# Patient Record
Sex: FEMALE | Race: WHITE | NOT HISPANIC OR LATINO | Employment: FULL TIME | URBAN - METROPOLITAN AREA
[De-identification: names, ages, dates, MRNs, and addresses within clinical notes are randomized per-mention and may not be internally consistent; named-entity substitution may affect disease eponyms.]

---

## 2017-10-11 ENCOUNTER — OFFICE VISIT - RIVER FALLS (OUTPATIENT)
Dept: FAMILY MEDICINE | Facility: CLINIC | Age: 37
End: 2017-10-11

## 2017-10-26 ENCOUNTER — OFFICE VISIT - RIVER FALLS (OUTPATIENT)
Dept: FAMILY MEDICINE | Facility: CLINIC | Age: 37
End: 2017-10-26

## 2017-10-26 ASSESSMENT — MIFFLIN-ST. JEOR: SCORE: 1340.37

## 2018-11-14 ENCOUNTER — OFFICE VISIT - RIVER FALLS (OUTPATIENT)
Dept: FAMILY MEDICINE | Facility: CLINIC | Age: 38
End: 2018-11-14

## 2018-11-14 ASSESSMENT — MIFFLIN-ST. JEOR: SCORE: 1344.9

## 2020-01-02 ENCOUNTER — OFFICE VISIT - RIVER FALLS (OUTPATIENT)
Dept: FAMILY MEDICINE | Facility: CLINIC | Age: 40
End: 2020-01-02

## 2020-01-02 ASSESSMENT — MIFFLIN-ST. JEOR: SCORE: 1358.51

## 2021-01-04 ENCOUNTER — OFFICE VISIT - RIVER FALLS (OUTPATIENT)
Dept: FAMILY MEDICINE | Facility: CLINIC | Age: 41
End: 2021-01-04

## 2021-01-04 ASSESSMENT — MIFFLIN-ST. JEOR: SCORE: 1360.33

## 2021-10-29 ENCOUNTER — OFFICE VISIT - RIVER FALLS (OUTPATIENT)
Dept: FAMILY MEDICINE | Facility: CLINIC | Age: 41
End: 2021-10-29

## 2021-10-29 ASSESSMENT — MIFFLIN-ST. JEOR: SCORE: 1365.77

## 2022-01-04 ENCOUNTER — COMMUNICATION - RIVER FALLS (OUTPATIENT)
Dept: FAMILY MEDICINE | Facility: CLINIC | Age: 42
End: 2022-01-04

## 2022-01-05 ENCOUNTER — COMMUNICATION - RIVER FALLS (OUTPATIENT)
Dept: FAMILY MEDICINE | Facility: CLINIC | Age: 42
End: 2022-01-05

## 2022-01-14 ENCOUNTER — AMBULATORY - RIVER FALLS (OUTPATIENT)
Dept: FAMILY MEDICINE | Facility: CLINIC | Age: 42
End: 2022-01-14

## 2022-01-26 ENCOUNTER — OFFICE VISIT - RIVER FALLS (OUTPATIENT)
Dept: FAMILY MEDICINE | Facility: CLINIC | Age: 42
End: 2022-01-26

## 2022-01-26 LAB
CHOL/HDL RATIO - HISTORICAL: 2.6 UMOL/L
CHOLEST SERPL-MCNC: 261 MG/DL
GLUCOSE BLD-MCNC: 86 MG/DL
HDLC SERPL-MCNC: 102 MG/DL
LDLC SERPL CALC-MCNC: 160 MG/DL
TRIGL SERPL-MCNC: 110 MG/DL

## 2022-01-28 ENCOUNTER — TRANSFERRED RECORDS (OUTPATIENT)
Dept: HEALTH INFORMATION MANAGEMENT | Facility: CLINIC | Age: 42
End: 2022-01-28

## 2022-01-28 ENCOUNTER — COMMUNICATION - RIVER FALLS (OUTPATIENT)
Dept: FAMILY MEDICINE | Facility: CLINIC | Age: 42
End: 2022-01-28

## 2022-01-28 LAB — HPV ABSTRACT: NORMAL

## 2022-02-11 VITALS
DIASTOLIC BLOOD PRESSURE: 92 MMHG | OXYGEN SATURATION: 98 % | HEIGHT: 65 IN | HEART RATE: 69 BPM | WEIGHT: 154.2 LBS | TEMPERATURE: 98.9 F | SYSTOLIC BLOOD PRESSURE: 140 MMHG | BODY MASS INDEX: 25.69 KG/M2

## 2022-02-11 VITALS
OXYGEN SATURATION: 99 % | HEART RATE: 53 BPM | DIASTOLIC BLOOD PRESSURE: 78 MMHG | HEIGHT: 65 IN | WEIGHT: 150.8 LBS | BODY MASS INDEX: 25.12 KG/M2 | SYSTOLIC BLOOD PRESSURE: 122 MMHG

## 2022-02-11 VITALS
HEART RATE: 64 BPM | BODY MASS INDEX: 25.89 KG/M2 | WEIGHT: 155.4 LBS | OXYGEN SATURATION: 98 % | DIASTOLIC BLOOD PRESSURE: 82 MMHG | TEMPERATURE: 97.6 F | HEIGHT: 65 IN | SYSTOLIC BLOOD PRESSURE: 130 MMHG

## 2022-02-11 VITALS
BODY MASS INDEX: 24.96 KG/M2 | TEMPERATURE: 98.3 F | WEIGHT: 149.8 LBS | DIASTOLIC BLOOD PRESSURE: 84 MMHG | SYSTOLIC BLOOD PRESSURE: 135 MMHG | HEART RATE: 53 BPM | HEIGHT: 65 IN

## 2022-02-11 VITALS
TEMPERATURE: 97.2 F | HEART RATE: 60 BPM | WEIGHT: 153.8 LBS | HEIGHT: 65 IN | SYSTOLIC BLOOD PRESSURE: 124 MMHG | BODY MASS INDEX: 25.62 KG/M2 | DIASTOLIC BLOOD PRESSURE: 70 MMHG

## 2022-02-16 NOTE — PROGRESS NOTES
Patient:   CASS ALLEN            MRN: [id number]            FIN: [id number]               Age:   37 years     Sex:  Female     :  1980   Associated Diagnoses:   Depression, Recurrent; Annual physical exam   Author:   Joana Louis      Visit Information      Date of Service: 10/26/2017 04:00 pm  Performing Location: [facility]  Encounter#: [id number]      Primary Care Provider (PCP):  Joel Smallwood MD    NPI# [id number]      Referring Provider:  Joana Louis    NPI# [id number]      Chief Complaint   10/26/2017 4:17 PM CDT   Px. No other concerns today.      History of Present Illness   Patient presents for annual physical exam.  Denies any concerns at this time.  Remains on sertraline for depression, PHQ9 = 2.  Normal PAP with negative HPV 2016.      Review of Systems   Constitutional:  Negative.    Eye:  Negative.    Ear/Nose/Mouth/Throat:  Negative.    Respiratory:  Negative.    Cardiovascular:  Negative.    Gastrointestinal:  Negative.    Genitourinary:  Negative.    Gynecologic:  Negative.    Hematology/Lymphatics:  Negative.    Endocrine:  Negative.    Immunologic:  Negative.    Musculoskeletal:  Negative.    Integumentary:  Negative.    Neurologic:  Negative.    Psychiatric:  Negative.              Health Status   Allergies:    Allergic Reactions (Selected)  Severity Not Documented  Percocet 10/325 (Itchy)   Problem list:    All Problems  Dyslipidemia / ICD-9-.4 / Confirmed  GERD (Gastroesophageal Reflux Disease) / ICD-9-.81 / Confirmed  Hyperhidrosis / ICD-9-.8 / Confirmed  Depression, Recurrent / SNOMED CT 059404274 / Confirmed  Resolved: Pregnancy / SNOMED CT 317372681  Resolved: Tobacco abuse / ICD-9-.1   Medications:  (Selected)   Prescriptions  Prescribed  sertraline 100 mg oral tablet: 1 tab(s) ( 100 mg ), po, daily, # 90 tab(s), 4 Refill(s), Type: Maintenance, Pharmacy: [facility], Pt must be seen for further  refills--overdue for annual visit w/ NCB., 1 tab(s) po daily      Histories   Past Medical History:    Active  GERD (Gastroesophageal Reflux Disease) (530.81)  Depression, Recurrent (269365966)  Dyslipidemia (272.4)  Hyperhidrosis (780.8)  Resolved  Tobacco abuse (305.1):  Resolved.  Comments:  2014 CDT 6:27 PM CDT - Argelia Richardson  Quit.  Pregnancy (896286933):  Resolved on 10/27/2001 at 21 years.   Family History:    Multiple Sclerosis  Mother (Ange)  CA - Lung cancer  Grandmother (M)  Cancer of ovary  Aunt (M) ()  Hypercholesterolemia  Father  Tobacco abuse  Grandmother (M)  Father     Procedure history:    HPV - Human papillomavirus test negative (6591083879) on 4/3/2012 at 31 Years.  Comments:  2012 11:07 AM - Argelia Richardson  Low risk for cervical cancer. OK to have pap q 3 yrs.  Meniscal repair (258813613) in the month of 2009 at 29 Years.  Comments:  3/28/2011 6:03 PM - Denise Estrada  Left knee (?ACL tear)  HPV - Human papillomavirus test negative (2926785026) on 2009 at 28 Years.  Comments:  3/29/2011 4:24 PM - Argelia Richardson  Low risk for cervical cancer. OK to have pap q 3 yrs.  Liposuction of abdomen (7349179151) in the month of 2004 at 24 Years.   section (90851212) in the month of 10/2001 at 21 Years.  Comments:  3/28/2011 5:48 PM - Denise Estrada  Failure to progress.  D&C - Dilatation and curettage (1234236440) on 7/3/2000 at 20 Years.  Comments:  3/28/2011 5:48 PM - Denise Estrada  Miscarriage  LASIK ([id number]).   Social History:        Alcohol Assessment            1-2 times per week      Tobacco Assessment: Past            Cigarettes                     Comments:                      2014 - Jolene Aguila CMA                     Quit 2011 - Argelia Richardson                     3-4/week.      Substance Abuse Assessment: Denies Substance Abuse            Never      Employment and  Education Assessment            Employed, Work/School description: .      Home and Environment Assessment            Marital status:  (Living together).  Spouse/Partner name: Ariel Gomez.      Nutrition and Health Assessment            Type of diet: Regular.      Exercise and Physical Activity Assessment            Exercise frequency: 5-6 times/week.  Exercise type: Aerobics, Weight lifting, Yoga.      Sexual Assessment            Sexually active: Yes.  Sexual orientation: Heterosexual.  Contraceptive Use Details:  vasectomy.        Physical Examination   Vital Signs   10/26/2017 4:17 PM CDT Temperature Tympanic 98.3 DegF    Peripheral Pulse Rate 53 bpm  LOW    Pulse Site Brachial artery    HR Method Electronic    Systolic Blood Pressure 135 mmHg    Diastolic Blood Pressure 84 mmHg    Mean Arterial Pressure 101 mmHg    BP Site Right arm    BP Method Electronic      Measurements from flowsheet : Measurements   10/26/2017 4:17 PM CDT Height Measured - Standard 65 in    Weight Measured - Standard 149.8 lb    BSA 1.76 m2    Body Mass Index 24.93 kg/m2      General:  Alert and oriented, No acute distress.    Eye:  Pupils are equal, round and reactive to light, Extraocular movements are intact, Normal conjunctiva.    HENT:  Normocephalic, Tympanic membranes are clear, Oral mucosa is moist, No pharyngeal erythema.    Neck:  Supple, Non-tender, No lymphadenopathy, No thyromegaly.    Respiratory:  Breath sounds are equal, Symmetrical chest wall expansion.         Respirations: Are within normal limits.         Pattern: Regular.         Breath sounds: Bilateral, Within normal limits.    Cardiovascular:  Normal rate, Regular rhythm, No murmur, Normal peripheral perfusion, No edema.    Gastrointestinal:  Soft, Non-tender, Non-distended, Normal bowel sounds, No organomegaly.    Musculoskeletal:  Normal range of motion, Normal strength, No tenderness, Normal gait.    Integumentary:  Warm, Dry, No  rash.    Neurologic:  Alert, Oriented, Normal sensory, Normal motor function, Cranial Nerves II-XII are grossly intact.    Psychiatric:  Cooperative, Appropriate mood & affect, Normal judgment, Non-suicidal.       Review / Management   Results review      Impression and Plan   Diagnosis     Depression, Recurrent (ZZY80-GP F33.0).     Annual physical exam (KRW20-OV Z00.00).     Course:  Progressing as expected.    Patient Instructions:       Counseled: Patient, Regarding diagnosis, Regarding treatment, Regarding medications, Activity, Verbalized understanding.    Orders     Orders (Selected)   Prescriptions  Prescribed  sertraline 100 mg oral tablet: 1 tab(s) ( 100 mg ), po, daily, # 90 tab(s), 4 Refill(s), Type: Maintenance, Pharmacy: [facility] [place], Pt must be seen for further refills--overdue for annual visit w/ NCB., 1 tab(s) po daily.

## 2022-02-16 NOTE — PROGRESS NOTES
Patient:   CASS ALLEN            MRN: [id number]            FIN: [id number]               Age:   38 years     Sex:  Female     :  1980   Associated Diagnoses:   Well adult; Depression, Recurrent   Author:   Joana Louis      Chief Complaint   2018 3:51 PM CST   Annual exam        Well Adult History   Well Adult History             The patient presents for well adult exam, doing well  wants to continue sertraline  pap UTD 2016, no hx abnormal  will take flu shot today  no concerns  works in collections.  The general health status is good.  The patient's diet is described as balanced.  Exercise: routine.  Associated symptoms consist of none.  Medical encounters:.  Additional pertinent history: tobacco use none.        Review of Systems   Constitutional:  Negative except as documented in history of present illness.    Eye:  Negative except as documented in history of present illness.    Respiratory:  Negative except as documented in history of present illness.    Cardiovascular:  Negative except as documented in history of present illness.    Breast:  Negative.    Gastrointestinal:  Negative except as documented in history of present illness.    Genitourinary:  Negative except as documented in history of present illness.    Gynecologic:  Negative except as documented in history of present illness.    Hematology/Lymphatics:  Negative except as documented in history of present illness.    Endocrine:  Negative except as documented in history of present illness.    Immunologic:  Negative except as documented in history of present illness.    Musculoskeletal:  Negative except as documented in history of present illness.    Integumentary:  Negative except as documented in history of present illness.    Neurologic:  Negative except as documented in history of present illness.    Psychiatric:  Negative except as documented in history of present illness.              Health Status   Allergies:    Allergic  Reactions (Selected)  Severity Not Documented  Percocet 10/325 (Itchy)   Medications:  (Selected)   Prescriptions  Prescribed  sertraline 100 mg oral tablet: = 1 tab(s) ( 100 mg ), Oral, daily, # 90 tab(s), 4 Refill(s), Type: Maintenance, Pharmacy: [facility], \   Problem list:    All Problems  GERD (gastroesophageal reflux disease) / SNOMED CT 827456975 / Confirmed  Depression, Recurrent / SNOMED CT 622663382 / Confirmed  Dyslipidemia / SNOMED CT 9289996352 / Confirmed  Hyperhidrosis / SNOMED CT 174806434 / Confirmed  Resolved: Tobacco abuse / ICD-9-.1  Quit.  Resolved: Pregnancy / SNOMED CT 702140633      Histories   Past Medical History:    Active  GERD (gastroesophageal reflux disease) (465666858)  Depression, Recurrent (859208868)  Dyslipidemia (2469750033)  Hyperhidrosis (771799121)  Resolved  Tobacco abuse (305.1):  Resolved.  Comments:  2014 CDT 6:27 PM CDT - Argelia Richardson  Quit.  Pregnancy (834404094):  Resolved on 10/27/2001 at 21 years.   Family History:    Son: Alex      History is negative.  Mother: Ange    Multiple Sclerosis  Father  Tobacco abuse  Hypercholesterolemia  Sister    History is negative.  Brother    History is negative.  Grandmother (M)  CA - Lung cancer  Tobacco abuse  Aunt (M):  ()  Age at death unknown.   Cancer of ovary     Procedure history:    HPV - Human papillomavirus test negative (SNOMED CT 7428032974) on 4/3/2012 at 31 Years.  Comments:  2012 11:07 AM - Argelia Richardson  Low risk for cervical cancer. OK to have pap q 3 yrs.  Meniscal repair (SNOMED CT 435640368) in the month of 2009 at 29 Years.  Comments:  3/28/2011 6:03 PM - Denise Estrada  Left knee (?ACL tear)  HPV - Human papillomavirus test negative (SNOMED CT 4517413333) on 2009 at 28 Years.  Comments:  3/29/2011 4:24 PM - Argelia Richardson  Low risk for cervical cancer. OK to have pap q 3 yrs.  Liposuction of abdomen (SNOMED CT 6318932882)  in the month of 2004 at 24 Years.   section (SNOMED CT 31272323) in the month of 10/2001 at 21 Years.  Comments:  3/28/2011 5:48 PM - Denise Estrada  Failure to progress.  D&C - Dilatation and curettage (SNOMED CT 8740949707) on 7/3/2000 at 20 Years.  Comments:  3/28/2011 5:48 PM - Denise Estrada  Miscarriage  LASIK (SNOMED CT 1497847478).      Physical Examination   Vital Signs   2018 3:51 PM CST Peripheral Pulse Rate 53 bpm  LOW    Pulse Site Radial artery    Systolic Blood Pressure 122 mmHg    Diastolic Blood Pressure 78 mmHg    Mean Arterial Pressure 93 mmHg    BP Site Right arm    Oxygen Saturation 99 %      Measurements from flowsheet : Measurements   2018 3:51 PM CST Height Measured - Standard 65.00 in    Weight Measured - Standard 150.8 lb    BSA 1.77 m2    Body Mass Index 25.09 kg/m2  HI      General:  Alert and oriented, No acute distress, vital signs stable, as noted above.    Eye:  Pupils are equal, round and reactive to light, Extraocular movements are intact, Normal conjunctiva.    HENT:  Normocephalic, Tympanic membranes are clear, Normal hearing, Oral mucosa is moist, No pharyngeal erythema.    Neck:  Supple, Non-tender, No lymphadenopathy, No thyromegaly.    Respiratory:  Lungs are clear to auscultation, Respirations are non-labored, Breath sounds are equal, Symmetrical chest wall expansion.    Cardiovascular:  Normal rate, Regular rhythm, No murmur, No edema.    Breast:  No mass, No tenderness, No discharge, Breasts examined .  No infra nor supraclavicular nodes palpable.  No axillary nodes or masses palpable.  No nipple discharge. Breasts normal throughout.    Gastrointestinal:  Soft, Non-tender, Non-distended, No organomegaly.    Musculoskeletal:  Normal range of motion, Normal strength, No deformity, Normal gait.    Integumentary:  Warm, Dry, Pink, Intact, No rash.    Neurologic:  Alert, Oriented, Normal sensory, Normal motor function, Cranial Nerves II-XII are grossly  intact.    Psychiatric:  Cooperative, Appropriate mood & affect, Normal judgment, PHQ 9/CAGE questionaire reviewed and discussed with patient,  see score.       Impression and Plan   Diagnosis     Well adult (TWB26-AJ Z00.00).     Depression, Recurrent (RDM98-TL F33.0).     Patient Instructions:       Counseled: Patient, Regarding diagnosis, Regarding medications, Verbalized understanding, counseled on health benefits of healthy weight, regular exercise, healthy diet.    Orders     Orders (Selected)   Prescriptions  Prescribed  sertraline 100 mg oral tablet: = 1 tab(s) ( 100 mg ), Oral, daily, # 90 tab(s), 4 Refill(s), Type: Maintenance, Pharmacy: [facility] [place], \.

## 2022-02-16 NOTE — NURSING NOTE
Depression Screening Entered On:  1/3/2020 10:54 AM CST    Performed On:  1/2/2020 10:52 AM CST by Marisel Raymundo               Depression Screening   Little Interest - Pleasure in Activities :   Not at all   Feeling Down, Depressed, Hopeless :   Not at all   Initial Depression Screen Score :   0    Trouble Falling or Staying Asleep :   Not at all   Feeling Tired or Little Energy :   Not at all   Poor Appetite or Overeating :   Not at all   Feeling Bad About Yourself :   Not at all   Trouble Concentrating :   Not at all   Moving or Speaking Slowly :   Not at all   Thoughts Better Off Dead or Hurting Self :   Not at all   Detailed Depression Screen Score :   0    Total Depression Screen Score :   0    Marisel Raymundo - 1/3/2020 10:52 AM CST

## 2022-02-16 NOTE — NURSING NOTE
"Comprehensive Intake Entered On:  10/29/2021 7:08 AM CDT    Performed On:  10/29/2021 7:05 AM CDT by Reta Londono LPN               Summary   Chief Complaint :   having pain in her collar bone, \"feels out of place\", has been seeing chiroprator for this   Menstrual Status :   Menarcheal   Weight Measured :   155.4 lb(Converted to: 155 lb 6 oz, 70.488 kg)    Height Measured :   65 in(Converted to: 5 ft 5 in, 165.10 cm)    Body Mass Index :   25.86 kg/m2 (HI)    Body Surface Area :   1.8 m2   Systolic Blood Pressure :   130 mmHg   Diastolic Blood Pressure :   82 mmHg (HI)    Mean Arterial Pressure :   98 mmHg   Peripheral Pulse Rate :   64 bpm   BP Site :   Right arm   BP Method :   Manual   Temperature Tympanic :   97.6 DegF(Converted to: 36.4 DegC)  (LOW)    Oxygen Saturation :   98 %   Race :      Languages :   English   Ethnicity :   Not  or    Reta Londono LPN - 10/29/2021 7:05 AM CDT   Health Status   Allergies Verified? :   Yes   Medication History Verified? :   Yes   Medical History Verified? :   No   Pre-Visit Planning Status :   Not completed   Tobacco Use? :   Former smoker   Reta Londono LPN - 10/29/2021 7:05 AM CDT   Meds / Allergies   (As Of: 10/29/2021 7:08:07 AM CDT)   Allergies (Active)   Percocet 10/325  Estimated Onset Date:   Unspecified ; Reactions:   Itchy ; Created By:   Kathi Levine; Reaction Status:   Active ; Category:   Drug ; Substance:   Percocet 10/325 ; Type:   Allergy ; Updated By:   Kathi Levine; Reviewed Date:   1/2/2020 5:19 PM CST        Medication List   (As Of: 10/29/2021 7:08:07 AM CDT)   Prescription/Discharge Order    sertraline  :   sertraline ; Status:   Prescribed ; Ordered As Mnemonic:   sertraline 100 mg oral tablet ; Simple Display Line:   100 mg, 1 tab(s), Oral, daily, 90 tab(s), 3 Refill(s) ; Ordering Provider:   Joana Louis; Catalog Code:   sertraline ; Order Dt/Tm:   1/4/2021 4:24:22 PM CST  "

## 2022-02-16 NOTE — NURSING NOTE
Comprehensive Intake Entered On:  1/2/2020 5:20 PM CST    Performed On:  1/2/2020 5:15 PM CST by Lenora Geller               Summary   Chief Complaint :   Pt here ofr annual physical   Menstrual Status :   Menarcheal   Weight Measured :   153.8 lb(Converted to: 153 lb 13 oz, 69.76 kg)    Height Measured :   65 in(Converted to: 5 ft 5 in, 165.10 cm)    Body Mass Index :   25.59 kg/m2 (HI)    Body Surface Area :   1.79 m2   Systolic Blood Pressure :   124 mmHg   Diastolic Blood Pressure :   70 mmHg   Mean Arterial Pressure :   88 mmHg   Peripheral Pulse Rate :   60 bpm   BP Site :   Right arm   Pulse Site :   Radial artery   BP Method :   Manual   HR Method :   Manual   Temperature Tympanic :   97.2 DegF(Converted to: 36.2 DegC)  (LOW)    Race :      Languages :   English   Ethnicity :   Not  or    Lenora Geller - 1/2/2020 5:15 PM CST   Health Status   Allergies Verified? :   Yes   Medication History Verified? :   Yes   Medical History Verified? :   Yes   Pre-Visit Planning Status :   Completed   Tobacco Use? :   Never smoker   Lenora Geller - 1/2/2020 5:15 PM CST   Consents   Consent for Immunization Exchange :   Consent Granted   Consent for Immunizations to Providers :   Consent Granted   Lenora Geller - 1/2/2020 5:15 PM CST   Meds / Allergies   (As Of: 1/2/2020 5:20:01 PM CST)   Allergies (Active)   Percocet 10/325  Estimated Onset Date:   Unspecified ; Reactions:   Itchy ; Created By:   Kathi Levine; Reaction Status:   Active ; Category:   Drug ; Substance:   Percocet 10/325 ; Type:   Allergy ; Updated By:   Kathi Levine; Reviewed Date:   1/2/2020 5:19 PM CST        Medication List   (As Of: 1/2/2020 5:20:01 PM CST)   Prescription/Discharge Order    sertraline  :   sertraline ; Status:   Prescribed ; Ordered As Mnemonic:   sertraline 100 mg oral tablet ; Simple Display Line:   100 mg, 1 tab(s), Oral, daily, 30 tab(s), 0 Refill(s) ; Ordering Provider:   Catrachito CABELLO,  Joana; Catalog Code:   sertraline ; Order Dt/Tm:   12/9/2019 5:41:08 PM CST

## 2022-02-16 NOTE — NURSING NOTE
Comprehensive Intake Entered On:  1/4/2021 4:06 PM CST    Performed On:  1/4/2021 4:00 PM CST by Reta Londono LPN               Summary   Chief Complaint :   here for annual exam   Last Menstrual Period :   12/18/2020 CST   Menstrual Status :   Menarcheal   Weight Measured :   154.2 lb(Converted to: 154 lb 3 oz, 69.944 kg)    Height Measured :   65 in(Converted to: 5 ft 5 in, 165.10 cm)    Body Mass Index :   25.66 kg/m2 (HI)    Body Surface Area :   1.79 m2   Systolic Blood Pressure :   140 mmHg (HI)    Diastolic Blood Pressure :   92 mmHg (HI)    Mean Arterial Pressure :   108 mmHg   Peripheral Pulse Rate :   69 bpm   BP Site :   Right arm   BP Method :   Manual   Temperature Tympanic :   98.9 DegF(Converted to: 37.2 DegC)    Oxygen Saturation :   98 %   Race :      Languages :   English   Ethnicity :   Not  or    Reta Londono LPN - 1/4/2021 4:00 PM CST   Health Status   Allergies Verified? :   Yes   Medication History Verified? :   Yes   Medical History Verified? :   No   Pre-Visit Planning Status :   Completed   Tobacco Use? :   Former smoker   Reta Londono LPN - 1/4/2021 4:00 PM CST   Meds / Allergies   (As Of: 1/4/2021 4:06:59 PM CST)   Allergies (Active)   Percocet 10/325  Estimated Onset Date:   Unspecified ; Reactions:   Itchy ; Created By:   Kathi Levine; Reaction Status:   Active ; Category:   Drug ; Substance:   Percocet 10/325 ; Type:   Allergy ; Updated By:   Kathi Levine; Reviewed Date:   1/2/2020 5:19 PM CST        Medication List   (As Of: 1/4/2021 4:06:59 PM CST)   Prescription/Discharge Order    sertraline  :   sertraline ; Status:   Prescribed ; Ordered As Mnemonic:   sertraline 100 mg oral tablet ; Simple Display Line:   100 mg, 1 tab(s), Oral, daily, 90 tab(s), 3 Refill(s) ; Ordering Provider:   Joana Louis; Catalog Code:   sertraline ; Order Dt/Tm:   1/2/2020 5:33:16 PM CST            ID Risk Screen   Recent Travel History :   No recent  travel   Family Member Travel History :   No recent travel   Other Exposure to Infectious Disease :   Unknown   COVID-19 Testing Status :   No positive COVID-19 test   Reta Londono LPN - 1/4/2021 4:00 PM CST   Social History   Social History   (As Of: 1/4/2021 4:06:59 PM CST)   Alcohol:        1-2 times per week   (Last Updated: 3/29/2011 4:16:41 PM CDT by Jolene Aguila CMA)          Tobacco:        Former smoker, quit more than 30 days ago, Cigarettes   (Last Updated: 1/4/2021 4:04:56 PM CST by Reta Londono LPN)          Electronic Cigarette/Vaping:        Electronic Cigarette Use: Never.   (Last Updated: 1/4/2021 4:05:00 PM CST by Reta Londono LPN)          Substance Abuse:        Never   (Last Updated: 8/28/2015 12:00:25 PM CDT by Roxanna Cruz CMA)          Employment/School:        Employed, Work/School description: .   (Last Updated: 8/28/2015 12:00:41 PM CDT by Roxanna Cruz CMA)          Home/Environment:        Marital status:  (Living together).  Spouse/Partner name: Ariel Gomez.  Risks in environment: owns secured gun.   (Last Updated: 10/31/2017 1:55:38 PM CDT by Juliette Stewart)          Nutrition/Health:        Type of diet: Regular.   (Last Updated: 8/28/2015 12:01:05 PM CDT by Roxanna Cruz CMA)          Exercise:        Exercise frequency: Daily.  Exercise type: cardio, strength, stretch.   (Last Updated: 10/31/2017 1:56:11 PM CDT by Juliette Stewart)          Sexual:        Sexually active: Yes.  Sexual orientation: Heterosexual.  Contraceptive Use Details:  vasectomy.   (Last Updated: 9/28/2016 9:35:47 AM CDT by Roxanna Cruz CMA)

## 2022-02-16 NOTE — PROGRESS NOTES
"   Patient:   IZZY ALLEN            MRN: [id number]            FIN: [id number]               Age:   41 years     Sex:  Female     :  1980   Associated Diagnoses:   Bunion of left foot; Bunion of right foot; Changing skin lesion; Clavicular asymmetry; Tendonitis of shoulder, right; Shoulder pain, right   Author:   Joana Louis      Visit Information      Date of Service: 10/29/2021 06:57 am  Performing Location: [facility]  Encounter#: [id number]      Primary Care Provider (PCP):  Joana Louis    NPI# [id number]      Referring Provider:  Joana Louis    NPI# [id number]      Chief Complaint   10/29/2021 7:05 AM CDT   having pain in her collar bone, \"feels out of place\", has been seeing chiroprator for this      History of Present Illness   Izzy is here today to check on her right collar bone.  About 6 weeks ago, she was painting a house and then within a few days, she felt like her right shoulder/collar bone muscles were tight.  She does not recall an injury to the area.  She does lift weights frequently but does not recall an injury with this.  She has not been able to lift weights on that side since the pain started.  She feels discomfort constantly but the pain sharpens with certain movements.  She has full ROM but reports pain and favoring of that side.  She noticed that her right clavicle is bulging a bit and there is a \"clicking feeling\" with certain arm/shoulder movements.  Yesika bruising.  She has seen the chiropractor 3x without relief.      She also has a small lump on the top of her right hand that she would like checked out, feels like it's getting bigger.   She also has bilateral bunions so she would like to see the foot and ankle specialist.   She would like a flu shot today.       Review of Systems   Constitutional:  Negative.    Respiratory:  Negative.    Cardiovascular:  Negative.    Gastrointestinal:  Negative.    Hematology/Lymphatics:  Negative.  "   Endocrine:  Negative.    Immunologic:  Negative.    Musculoskeletal:  Negative except as documented in history of present illness.    Integumentary:  Negative except as documented in history of present illness.    Neurologic:  Negative.    Psychiatric:  Negative.       Health Status   Allergies:    Allergic Reactions (Selected)  Severity Not Documented  Percocet 10/325 (Itchy)   Medications:  (Selected)   Prescriptions  Prescribed  sertraline 100 mg oral tablet: = 1 tab(s) ( 100 mg ), Oral, daily, # 90 tab(s), 3 Refill(s), Type: Maintenance, Pharmacy: [facility], \, 1 tab(s) Oral daily, 65, in, 21 16:00:00 CST, Height Measured, 154.2, lb, 21 16:00:00 CST, Weight Measured,    Medications          *denotes recorded medication          sertraline 100 mg oral tablet: 100 mg, 1 tab(s), Oral, daily, 90 tab(s), 3 Refill(s).       Problem list:    All Problems (Selected)  Dyslipidemia / SNOMED CT 2601448027 / Confirmed  GERD (gastroesophageal reflux disease) / SNOMED CT 615123245 / Confirmed  Hyperhidrosis / SNOMED CT 033010166 / Confirmed  Depression, Recurrent / SNOMED CT 390900425 / Confirmed      Histories   Past Medical History:    Active  GERD (gastroesophageal reflux disease) (528482962)  Depression, Recurrent (732279773)  Dyslipidemia (8131200231)  Hyperhidrosis (696256028)  Resolved  Tobacco abuse (305.1):  Resolved.  Comments:  2014 CDT 6:27 PM CDT - Norma CABELLO, Argelia Guerrero.  Pregnancy (107927936):  Resolved on 10/27/2001 at 21 years.   Family History:    Multiple Sclerosis  Mother (Aneg)  CA - Lung cancer  Grandmother (M)  Cancer of ovary  Aunt (M) ()  Hypercholesterolemia  Father  Tobacco abuse  Grandmother (M)  Father  Depression  Father  Grandparent     Procedure history:    Mammogram (143223880) on 2020 at 39 Years.  Comments:  2020 7:50 AM CST - Reta Londono LPN  ACR 1 Negative. Recommend annual mammograms.  HPV - Human  papillomavirus test negative (2818369469) on 4/3/2012 at 31 Years.  Comments:  2012 11:07 AM CDT - Argelia Richardson  Low risk for cervical cancer. OK to have pap q 3 yrs.  Meniscal repair (766105994) in the month of 2009 at 29 Years.  Comments:  3/28/2011 6:03 PM LIVIAT - Denise Estrada  Left knee (?ACL tear)  HPV - Human papillomavirus test negative (7139794762) on 2009 at 28 Years.  Comments:  3/29/2011 4:24 PM LIVIAT - Argelia Richardson  Low risk for cervical cancer. OK to have pap q 3 yrs.  Liposuction of abdomen (7411144431) in the month of 2004 at 24 Years.   section (42866130) in the month of 10/2001 at 21 Years.  Comments:  3/28/2011 5:48 PM Denise Sanches  Failure to progress.  D&C - Dilatation and curettage (3208050992) on 7/3/2000 at 20 Years.  Comments:  3/28/2011 5:48 PM Denise Sanches  Miscarriage  LASIK (1812248968).   Social History:        Electronic Cigarette/Vaping Assessment            Electronic Cigarette Use: Never.      Alcohol Assessment            1-2 times per week      Tobacco Assessment            Former smoker, quit more than 30 days ago, Cigarettes      Substance Abuse Assessment            Never      Employment and Education Assessment            Employed, Work/School description: .      Home and Environment Assessment            Marital status:  (Living together).  Spouse/Partner name: Ariel Gomez.  Risks in environment: owns               secured gun.      Nutrition and Health Assessment            Type of diet: Regular.      Exercise and Physical Activity Assessment            Exercise frequency: Daily.  Exercise type: cardio, strength, stretch.      Sexual Assessment            Sexually active: Yes.  Sexual orientation: Heterosexual.  Contraceptive Use Details:  vasectomy.        Physical Examination   Vital Signs   10/29/2021 7:05 AM CDT   Temperature Tympanic      97.6 DegF  LOW     Measurements from flowsheet  ": Measurements   10/29/2021 7:05 AM CDT Height Measured - Standard 65 in    Weight Measured - Standard 155.4 lb    BSA 1.8 m2    Body Mass Index 25.86 kg/m2  HI      General:  Alert and oriented, No acute distress.    Neck:  Supple, Non-tender, No lymphadenopathy, No thyromegaly, Neck muscles without abnormality upon palpation. .    Respiratory:  Lungs are clear to auscultation, Respirations are non-labored, Breath sounds are equal, Symmetrical chest wall expansion, No chest wall tenderness.    Cardiovascular:  Normal rate, Regular rhythm, No murmur, No gallop.    Musculoskeletal:  Normal range of motion, Normal strength, No tenderness, Protrusion of right clavicle proximal to midline.  Palpable \"popping\" with shoulder ROM.  Asymmetrical compared to left clavicle.  Full ROM with facial grimacing when right arm extended upward, Bilateral bunions noted on lower great toe joints, no reddness. .    Integumentary:  Warm, Dry, Intact, 0.5cm slightly pink raised lesion on dorsal surface of right hand.  Negative for discharge or dimpling.   .    Neurologic:  Alert, Oriented, Normal sensory.    Psychiatric:  Cooperative, Appropriate mood & affect, Normal judgment.       Health Maintenance      Recommendations     Pending (in the next year)        OverDue           Lipid Disorders Screen due  10/17/16  and every 1  year(s)           Breast Cancer Screen due  02/05/21  and every 1  year(s)           Influenza Vaccine due  09/01/21  and every 1  year(s)           Cervical Cancer Screen (if sexually active) due  09/23/21  and every 5  year(s)        Due            Intimate Partner Violence Screening due  10/29/21  and every 1  year(s)           Type 2 Diabetes Mellitus Screen due  10/29/21  Variable frequency        Due In Future            Depression Screen not due until  01/05/22  and every 1  year(s)     Satisfied (in the past 1 year)        Satisfied            Body Mass Index Check on  10/29/21.           Body Mass Index " Check on  01/04/21.           Depression Screen on  01/05/21.           Depression Screen on  01/05/21.           Depression Screen on  01/05/21.           High Blood Pressure Screen on  10/29/21.           High Blood Pressure Screen on  01/04/21.           Influenza Vaccine on  01/04/21.           Influenza Vaccine on  01/04/21.           Tobacco Use Screen on  10/29/21.           Tobacco Use Screen on  01/04/21.          Review / Management   Radiology results   X-ray, X-ray reviewed with patient, no abnormality noted.  Will await radiology over-read and if differs such that treatment would be altered,  will notify patient.       Impression and Plan   Diagnosis     Bunion of left foot (UFS76-QF M21.612).     Bunion of right foot (TSG33-ZZ M21.611).     Changing skin lesion (MXJ09-WD L98.9).     Clavicular asymmetry (YYD66-IK Q74.0).     Tendonitis of shoulder, right (GGL84-CG M77.8).     Shoulder pain, right (IPS89-KR M25.511).     Summary:  Ice to right shoulder 3x/day.  Return to clinic if no relief with oral prednisone. , Right dorsal hand lesion treated with liquid nitrogen x3 freeze/thaw cycles, Flu shot provided today. .    Orders     Orders (Selected)   Outpatient Orders  Ordered  Referral (Request): 10/29/21 8:04:00 CDT, Referred to: Orthopaedics, Referred to: foot and ankle, Additional instructions: please call her to set up, Bunion of left foot  Bunion of right foot  Completed  XR Clavicle Right (Request): Shoulder pain, right  Clavicular asymmetry  XR Shoulder Right (Request): Shoulder pain, right  Clavicular asymmetry  Prescriptions  Prescribed  predniSONE 10 mg oral tablet: = 3 tab(s) ( 30 mg ), Oral, daily, # 30 tab(s), 0 Refill(s), Type: Maintenance, Pharmacy: [facility], 3 tab(s) Oral daily,x10 day(s), 65, in, 10/29/21 7:05:00 CDT, Height Measured, 155.4, lb, 10/29/21 7:05:00 CDT, Weight Measured...  sertraline 100 mg oral tablet: = 1 tab(s) ( 100 mg ), Oral, daily, # 90  tab(s), 3 Refill(s), Type: Maintenance, Pharmacy: [facility], \, 1 tab(s) Oral daily, 65, in, 01/04/21 16:00:00 CST, Height Measured, 154.2, lb, 01/04/21 16:00:00 CST, Weight Measured.

## 2022-02-16 NOTE — PROGRESS NOTES
Patient:   CASS ALLEN            MRN: [id number]            FIN: [id number]               Age:   39 years     Sex:  Female     :  1980   Associated Diagnoses:   Well adult; Depression, Recurrent   Author:   Joana Louis      Visit Information      Date of Service: 2020 04:52 pm  Performing Location: [facility]  Encounter#: [id number]      Chief Complaint   2020 5:29 PM CST     Pt here for annual physical  (Modified)       Well Adult History   Well Adult History             The patient presents for well adult exam, diong well  same partner, monthly periods LMP now  pap not due this year  has never had mammo, will set up this year  dry red flaking itchy skin left low latteral leg for 6 months, using lotion.  happy with sertrlaine, doesn't want to stop.  The general health status is good.  The patient's diet is described as balanced.  Exercise: routine.  Associated symptoms consist of none.  Last menstrual period: regular.  Medical encounters:.  Additional pertinent history: tobacco use none.        Review of Systems   Constitutional:  Negative except as documented in history of present illness.    Eye:  Negative except as documented in history of present illness.    Respiratory:  Negative except as documented in history of present illness.    Cardiovascular:  Negative except as documented in history of present illness.    Breast:  Negative.    Gastrointestinal:  Negative except as documented in history of present illness.    Genitourinary:  Negative except as documented in history of present illness.    Gynecologic:  Negative except as documented in history of present illness.    Hematology/Lymphatics:  Negative except as documented in history of present illness.    Endocrine:  Negative except as documented in history of present illness.    Immunologic:  Negative except as documented in history of present illness.    Musculoskeletal:  Negative except as documented in history of  present illness.    Integumentary:  Negative except as documented in history of present illness.    Neurologic:  Negative except as documented in history of present illness.    Psychiatric:  Negative except as documented in history of present illness.              Health Status   Allergies:    Allergic Reactions (Selected)  Severity Not Documented  Percocet 10/325 (Itchy)   Medications:  (Selected)   Prescriptions  Prescribed  sertraline 100 mg oral tablet: = 1 tab(s) ( 100 mg ), Oral, daily, # 90 tab(s), 3 Refill(s), Type: Maintenance, Pharmacy: [facility], \, 1 tab(s) Oral daily  triamcinolone 0.5% topical cream: See Instructions, Instructions: Topical bid for up to 1 month on left leg, # 20 gm, 0 Refill(s), Type: Maintenance, Pharmacy: [facility], Topical bid for up to 1 month on left leg   Problem list:    All Problems  Depression, Recurrent / SNOMED CT 000638077 / Confirmed  Dyslipidemia / SNOMED CT 6026682028 / Confirmed  GERD (gastroesophageal reflux disease) / SNOMED CT 829118201 / Confirmed  Hyperhidrosis / SNOMED CT 052357358 / Confirmed  Resolved: Pregnancy / SNOMED CT 047316775  Resolved: Tobacco abuse / ICD-9-.1  Quit.      Histories   Past Medical History:    Active  GERD (gastroesophageal reflux disease) (069587356)  Depression, Recurrent (660139984)  Dyslipidemia (4514721512)  Hyperhidrosis (388050509)  Resolved  Tobacco abuse (305.1):  Resolved.  Comments:  2014 CDT 6:27 PM CDT - Norma CABELLO, Argelia  Quit.  Pregnancy (625523180):  Resolved on 10/27/2001 at 21 years.   Family History:    Son: Alex      History is negative.  Mother: Ange    Multiple Sclerosis  Father  Tobacco abuse  Hypercholesterolemia  Sister    History is negative.  Brother    History is negative.  Grandmother (M)  CA - Lung cancer  Tobacco abuse  Aunt (M):  ()  Age at death unknown.   Cancer of ovary     Procedure history:    HPV - Human papillomavirus  test negative (SNOMED CT 7239669816) on 4/3/2012 at 31 Years.  Comments:  2012 11:07 AM CDT - Argelia Richardson  Low risk for cervical cancer. OK to have pap q 3 yrs.  Meniscal repair (SNOMED CT 782794520) in the month of 2009 at 29 Years.  Comments:  3/28/2011 6:03 PM CDT - Denise Estrada  Left knee (?ACL tear)  HPV - Human papillomavirus test negative (SNOMED CT 7404921951) on 2009 at 28 Years.  Comments:  3/29/2011 4:24 PM CDT - Argelia Richardson  Low risk for cervical cancer. OK to have pap q 3 yrs.  Liposuction of abdomen (SNOMED CT 3159503520) in the month of 2004 at 24 Years.   section (SNOMED CT 47578735) in the month of 10/2001 at 21 Years.  Comments:  3/28/2011 5:48 PM Denise Sanches  Failure to progress.  D&C - Dilatation and curettage (SNOMED CT 3518057742) on 7/3/2000 at 20 Years.  Comments:  3/28/2011 5:48 PM Denise Sanches  Miscarriage  LASIK (SNOMED CT 3332731217).   Social History:        Alcohol Assessment            1-2 times per week      Tobacco Assessment            Former smoker, quit more than 30 days ago      Substance Abuse Assessment            Never      Employment and Education Assessment            Employed, Work/School description: .      Home and Environment Assessment            Marital status:  (Living together).  Spouse/Partner name: Ariel Gomez.  Risks in environment: owns               secured gun.      Nutrition and Health Assessment            Type of diet: Regular.      Exercise and Physical Activity Assessment            Exercise frequency: Daily.  Exercise type: cardio, strength, stretch.      Sexual Assessment            Sexually active: Yes.  Sexual orientation: Heterosexual.  Contraceptive Use Details:  vasectomy.        Physical Examination   Vital Signs   2020 5:15 PM CST Temperature Tympanic 97.2 DegF  LOW    Peripheral Pulse Rate 60 bpm    Pulse Site Radial artery    HR Method Manual     Systolic Blood Pressure 124 mmHg    Diastolic Blood Pressure 70 mmHg    Mean Arterial Pressure 88 mmHg    BP Site Right arm    BP Method Manual      Measurements from flowsheet : Measurements   1/2/2020 5:15 PM CST Height Measured - Standard 65 in    Weight Measured - Standard 153.8 lb    BSA 1.79 m2    Body Mass Index 25.59 kg/m2  HI      General:  Alert and oriented, No acute distress, vital signs stable, as noted above.    Eye:  Pupils are equal, round and reactive to light, Extraocular movements are intact, Normal conjunctiva.    HENT:  Normocephalic, Tympanic membranes are clear, Normal hearing, Oral mucosa is moist, No pharyngeal erythema.    Neck:  Supple, Non-tender, No lymphadenopathy, No thyromegaly.    Respiratory:  Lungs are clear to auscultation, Respirations are non-labored, Breath sounds are equal, Symmetrical chest wall expansion.    Cardiovascular:  Normal rate, Regular rhythm, No murmur, No edema.    Breast:  No mass, No tenderness, No discharge, Breasts examined .  No infra nor supraclavicular nodes palpable.  No axillary nodes or masses palpable.  No nipple discharge. Breasts normal throughout.    Gastrointestinal:  Soft, Non-tender, Non-distended, No organomegaly.    Musculoskeletal:  Normal range of motion, Normal strength, No deformity, Normal gait.    Integumentary:  Warm, Dry, Pink, Intact, No rash, dry rough raised patch pink left latteral side, approx 1 cm diameter.    Neurologic:  Alert, Oriented, Normal sensory, Normal motor function, Cranial Nerves II-XII are grossly intact.    Psychiatric:  Cooperative, Appropriate mood & affect, Normal judgment, PHQ 9/CAGE questionaire reviewed and discussed with patient,  see score.       Impression and Plan   Diagnosis     Well adult (HUR46-ID Z00.00).     Depression, Recurrent (KRU68-CQ F33.0).     Plan:  will treat patch as dry skin but if not resolvling with steroid, need removal, she is in agreement.    Patient Instructions:       Counseled:  Patient, Regarding diagnosis, Regarding medications, Verbalized understanding, counseled on health benefits of healthy weight, regular exercise, healthy diet.    Orders     Orders (Selected)   Prescriptions  Prescribed  sertraline 100 mg oral tablet: = 1 tab(s) ( 100 mg ), Oral, daily, # 90 tab(s), 3 Refill(s), Type: Maintenance, Pharmacy: [facility], \, 1 tab(s) Oral daily  triamcinolone 0.5% topical cream: See Instructions, Instructions: Topical bid for up to 1 month on left leg, # 20 gm, 0 Refill(s), Type: Maintenance, Pharmacy: [facility], Topical bid for up to 1 month on left leg.     she will set up screening 3D mammo.

## 2022-02-16 NOTE — NURSING NOTE
Depression Screening Entered On:  1/5/2021 7:03 AM CST    Performed On:  1/5/2021 7:02 AM CST by Reta Londono LPN               Depression Screening   Little Interest - Pleasure in Activities :   Not at all   Feeling Down, Depressed, Hopeless :   Not at all   Initial Depression Screen Score :   0 Score   Poor Appetite or Overeating :   Not at all   Trouble Falling or Staying Asleep :   Not at all   Feeling Tired or Little Energy :   Not at all   Feeling Bad About Yourself :   Not at all   Trouble Concentrating :   Not at all   Moving or Speaking Slowly :   Not at all   Thoughts Better Off Dead or Hurting Self :   Not at all   Detailed Depression Screen Score :   0    Total Depression Screen Score :   0    Reta Londono LPN - 1/5/2021 7:02 AM CST

## 2022-02-16 NOTE — PROGRESS NOTES
Patient:   CASS ALLEN            MRN: [id number]            FIN: [id number]               Age:   40 years     Sex:  Female     :  1980   Associated Diagnoses:   Well adult; Depression, Recurrent   Author:   Joana Louis      Visit Information      Date of Service: 2021 03:54 pm  Performing Location: [facility]  Encounter#: [id number]      Primary Care Provider (PCP):  Joana Louis    NPI# [id number]      Referring Provider:  Joana Louis    NPI# [id number]      Chief Complaint   2021 4:00 PM CST     here for annual exam        Well Adult History   Well Adult History             The patient presents for well adult exam, here for annual, wants to defer pap till next year, have been  normal  monthly periods   with vasectomy  working to loose some wt  no concerns  mammo last year normal  happy with sertraline, would like to continue same dose.  The general health status is good.  The patient's diet is described as balanced.  Exercise: routine.  Associated symptoms consist of none.  Last menstrual period: regular.  Medical encounters:.  Additional pertinent history: tobacco use none.        Review of Systems   Constitutional:  Negative except as documented in history of present illness.    Eye:  Negative except as documented in history of present illness.    Respiratory:  Negative except as documented in history of present illness.    Cardiovascular:  Negative except as documented in history of present illness.    Breast:  Negative.    Gastrointestinal:  Negative except as documented in history of present illness.    Genitourinary:  Negative except as documented in history of present illness.    Gynecologic:  Negative except as documented in history of present illness.    Hematology/Lymphatics:  Negative except as documented in history of present illness.    Endocrine:  Negative except as documented in history of present illness.    Immunologic:  Negative except  as documented in history of present illness.    Musculoskeletal:  Negative except as documented in history of present illness.    Integumentary:  Negative except as documented in history of present illness.    Neurologic:  Negative except as documented in history of present illness.    Psychiatric:  Negative except as documented in history of present illness.              Health Status   Allergies:    Allergic Reactions (Selected)  Severity Not Documented  Percocet 10/325 (Itchy)   Medications:  (Selected)   Prescriptions  Prescribed  sertraline 100 mg oral tablet: = 1 tab(s) ( 100 mg ), Oral, daily, # 90 tab(s), 3 Refill(s), Type: Maintenance, Pharmacy: [facility], \, 1 tab(s) Oral daily, 65, in, 21 16:00:00 CST, Height Measured, 154.2, lb, 21 16:00:00 CST, Weight Measured   Problem list:    All Problems  Dyslipidemia / SNOMED CT 8862319899 / Confirmed  GERD (gastroesophageal reflux disease) / SNOMED CT 197823564 / Confirmed  Hyperhidrosis / SNOMED CT 534201055 / Confirmed  Depression, Recurrent / SNOMED CT 328389276 / Confirmed  Resolved: Pregnancy / SNOMED CT 218556635  Resolved: Tobacco abuse / ICD-9-.1  Quit.      Histories   Past Medical History:    Active  GERD (gastroesophageal reflux disease) (352187788)  Depression, Recurrent (755538349)  Dyslipidemia (7037055158)  Hyperhidrosis (460255789)  Resolved  Tobacco abuse (305.1):  Resolved.  Comments:  2014 CDT 6:27 PM CDT - Norma CABELLO, Argelia  Quit.  Pregnancy (696582345):  Resolved on 10/27/2001 at 21 years.   Family History:    Son: Alex      History is negative.  Mother: Ange    Multiple Sclerosis  Father  Tobacco abuse  Hypercholesterolemia  Sister    History is negative.  Brother    History is negative.  Grandmother (M)  CA - Lung cancer  Tobacco abuse  Aunt (M):  ()  Age at death unknown.   Cancer of ovary     Procedure history:    Mammogram (SNOMED CT 774478075) performed by Catrachito CABELLO,  Joana on 2020 at 39 Years.  Comments:  2020 7:50 AM DAVID - Reta Londono LPN  ACR 1 Negative. Recommend annual mammograms.  HPV - Human papillomavirus test negative (SNOMED CT 0000894501) on 4/3/2012 at 31 Years.  Comments:  2012 11:07 AM CDT - Norma NP-CArgelia  Low risk for cervical cancer. OK to have pap q 3 yrs.  Meniscal repair (SNOMED CT 079202822) in the month of 2009 at 29 Years.  Comments:  3/28/2011 6:03 PM CDT - Denise Estrada  Left knee (?ACL tear)  HPV - Human papillomavirus test negative (SNOMED CT 7012399928) on 2009 at 28 Years.  Comments:  3/29/2011 4:24 PM CDT - Carmen-Jannette NP-CArgelia  Low risk for cervical cancer. OK to have pap q 3 yrs.  Liposuction of abdomen (SNOMED CT 7137181115) in the month of 2004 at 24 Years.   section (SNOMED CT 89335209) in the month of 10/2001 at 21 Years.  Comments:  3/28/2011 5:48 PM Denise Sanches  Failure to progress.  D&C - Dilatation and curettage (SNOMED CT 8359203038) on 7/3/2000 at 20 Years.  Comments:  3/28/2011 5:48 PM Denise Sanches  Miscarriage  LASIK (SNOMED CT 7249440413).   Social History:        Electronic Cigarette/Vaping Assessment            Electronic Cigarette Use: Never.      Alcohol Assessment            1-2 times per week      Tobacco Assessment            Former smoker, quit more than 30 days ago, Cigarettes      Substance Abuse Assessment            Never      Employment and Education Assessment            Employed, Work/School description: .      Home and Environment Assessment            Marital status:  (Living together).  Spouse/Partner name: Ariel Gomez.  Risks in environment: owns               secured gun.      Nutrition and Health Assessment            Type of diet: Regular.      Exercise and Physical Activity Assessment            Exercise frequency: Daily.  Exercise type: cardio, strength, stretch.      Sexual Assessment            Sexually active: Yes.   Sexual orientation: Heterosexual.  Contraceptive Use Details:  vasectomy.        Physical Examination   Vital Signs   1/4/2021 4:00 PM CST Temperature Tympanic 98.9 DegF    Peripheral Pulse Rate 69 bpm    Systolic Blood Pressure 140 mmHg  HI    Diastolic Blood Pressure 92 mmHg  HI    Mean Arterial Pressure 108 mmHg    BP Site Right arm    BP Method Manual    Oxygen Saturation 98 %      Measurements from flowsheet : Measurements   1/4/2021 4:00 PM CST Height Measured - Standard 65 in    Weight Measured - Standard 154.2 lb    BSA 1.79 m2    Body Mass Index 25.66 kg/m2  HI      General:  Alert and oriented, No acute distress, vital signs stable, as noted above.    Eye:  Pupils are equal, round and reactive to light, Extraocular movements are intact, Normal conjunctiva.    HENT:  Normocephalic, Tympanic membranes are clear, Normal hearing.    Neck:  Supple, Non-tender, No lymphadenopathy, No thyromegaly.    Respiratory:  Lungs are clear to auscultation, Respirations are non-labored, Breath sounds are equal, Symmetrical chest wall expansion.    Cardiovascular:  Normal rate, Regular rhythm, No murmur, No edema.    Breast:  No mass, No tenderness, No discharge, Breasts examined .  No infra nor supraclavicular nodes palpable.  No axillary nodes or masses palpable.  No nipple discharge. Breasts normal throughout.    Gastrointestinal:  Soft, Non-tender, Non-distended, No organomegaly.    Lymphatics:  no axillary, no supra or infraclavicular nor inguinal lymphadenopathy palpable.    Musculoskeletal:  Normal range of motion, Normal strength, No deformity, Normal gait.    Integumentary:  Warm, Dry, Pink, Intact, No rash.    Neurologic:  Alert, Oriented, Normal sensory, Normal motor function, Cranial Nerves II-XII are grossly intact.    Psychiatric:  Cooperative, Appropriate mood & affect, Normal judgment, PHQ 9/CAGE questionaire reviewed and discussed with patient,  see score.       Impression and Plan   Diagnosis      Well adult (IJA17-UU Z00.00).     Depression, Recurrent (IGX49-KE F33.0).     Patient Instructions:       Counseled: Patient, Regarding diagnosis, Regarding medications, Verbalized understanding, counseled on health benefits of healthy weight, regular exercise, healthy diet.    Orders     Orders (Selected)   Prescriptions  Prescribed  sertraline 100 mg oral tablet: = 1 tab(s) ( 100 mg ), Oral, daily, # 90 tab(s), 3 Refill(s), Type: Maintenance, Pharmacy: [facility], \, 1 tab(s) Oral daily, 65, in, 01/04/21 16:00:00 CST, Height Measured, 154.2, lb, 01/04/21 16:00:00 CST, Weight Measured.

## 2022-03-02 VITALS
WEIGHT: 155.9 LBS | HEIGHT: 66 IN | DIASTOLIC BLOOD PRESSURE: 86 MMHG | TEMPERATURE: 97.9 F | BODY MASS INDEX: 25.05 KG/M2 | OXYGEN SATURATION: 99 % | HEART RATE: 71 BPM | SYSTOLIC BLOOD PRESSURE: 136 MMHG

## 2022-03-02 NOTE — PROGRESS NOTES
Patient:   CASS ALLEN            MRN: [id number]            FIN: [id number]               Age:   41 years     Sex:  Female     :  1980   Associated Diagnoses:   Well adult; Depression, Recurrent; Ganglion cyst of finger   Author:   Joana Louis      Visit Information      Date of Service: 2022 06:50 am  Performing Location: [facility]  Encounter#: [id number]      Primary Care Provider (PCP):  Joana Louis    NPI# [id number]      Referring Provider:  Joana Louis    NPI# [id number]      Chief Complaint   2022 7:04 AM CST    annual exam        Well Adult History   Well Adult History             The patient presents for well adult exam, works from home, fully vaccinated  same partner , with vasectomy, she has monthly periods, LMP 1/5  due for pap, normal for years  she will schedule mammo  she brings in lipid number, , . Family members on statins but no hx cardiac/stroke event  Good calcium intake, counseled on ways to preserve that and reduce LDL  She cut her index finger 2 years ago and there is a non painful cyst near the area that bothers her, would like it gone. Denies limitation in ROm.  The general health status is good.  The patient's diet is described as balanced.  Exercise: routine.  Associated symptoms consist of none.  Last menstrual period: regular.  Medical encounters:.  Additional pertinent history: tobacco use none.        Review of Systems   Constitutional:  Negative except as documented in history of present illness.    Eye:  Negative except as documented in history of present illness.    Respiratory:  Negative except as documented in history of present illness.    Cardiovascular:  Negative except as documented in history of present illness.    Breast:  Negative.    Gastrointestinal:  Negative except as documented in history of present illness.    Genitourinary:  Negative except as documented in history of present illness.     Gynecologic:  Negative except as documented in history of present illness.    Hematology/Lymphatics:  Negative except as documented in history of present illness.    Endocrine:  Negative except as documented in history of present illness.    Immunologic:  Negative except as documented in history of present illness.    Musculoskeletal:  Negative except as documented in history of present illness.    Integumentary:  Negative except as documented in history of present illness.    Neurologic:  Negative except as documented in history of present illness.    Psychiatric:  Negative except as documented in history of present illness.              Health Status   Allergies:    Allergic Reactions (Selected)  Severity Not Documented  Percocet 10/325 (Itchy)   Medications:  (Selected)   Prescriptions  Prescribed  sertraline 100 mg oral tablet: = 1 tab(s) ( 100 mg ), Oral, daily, # 90 tab(s), 3 Refill(s), Type: Maintenance, Pharmacy: [facility], \, 1 tab(s) Oral daily, 65.5, in, 01/26/22 7:04:00 CST, Height Measured, 155.9, lb, 01/26/22 7:04:00 CST, Weight Measured,    Medications          *denotes recorded medication          sertraline 100 mg oral tablet: 100 mg, 1 tab(s), Oral, daily, 90 tab(s), 3 Refill(s).       Problem list:    All Problems  Depression, Recurrent / SNOMED CT 553234491 / Confirmed  Hyperhidrosis / SNOMED CT 087922026 / Confirmed  GERD (gastroesophageal reflux disease) / SNOMED CT 994874488 / Confirmed  Dyslipidemia / SNOMED CT 2789742988 / Confirmed  Resolved: Tobacco abuse / ICD-9-.1  Quit.  Resolved: Pregnancy / SNOMED CT 427830140      Histories   Past Medical History:    Active  GERD (gastroesophageal reflux disease) (826436389)  Depression, Recurrent (237947315)  Dyslipidemia (1471619579)  Hyperhidrosis (918010667)  Resolved  Tobacco abuse (305.1):  Resolved.  Comments:  8/14/2014 CDT 6:27 PM CDT - Argelia Richardson  Quit.  Pregnancy (383658023):  Resolved on  10/27/2001 at 21 years.   Family History:    Grandparent  Depression  Son: Alex      History is negative.  Mother: Ange    Multiple Sclerosis  Father  Tobacco abuse  Hypercholesterolemia  Depression  Sister    History is negative.  Brother    History is negative.  Grandmother (M)  CA - Lung cancer  Tobacco abuse  Aunt (M):  ()  Age at death unknown.   Cancer of ovary     Procedure history:    Mammogram (SNOMED CT 206529377) performed by Joana Louis on 2020 at 39 Years.  Comments:  2020 7:50 AM DAVID - Reta Londono LPN  ACR 1 Negative. Recommend annual mammograms.  HPV - Human papillomavirus test negative (SNOMED CT 9583432735) on 4/3/2012 at 31 Years.  Comments:  2012 11:07 AM LIVIAT - Argelia Richardson  Low risk for cervical cancer. OK to have pap q 3 yrs.  Meniscal repair (SNOMED CT 231600573) in the month of 2009 at 29 Years.  Comments:  3/28/2011 6:03 PM Denise Sanches  Left knee (?ACL tear)  HPV - Human papillomavirus test negative (SNOMED CT 9969220010) on 2009 at 28 Years.  Comments:  3/29/2011 4:24 PM LIVIAT - Argelia Richardson  Low risk for cervical cancer. OK to have pap q 3 yrs.  Liposuction of abdomen (SNOMED CT 4834208346) in the month of 2004 at 24 Years.   section (SNOMED CT 49175370) in the month of 10/2001 at 21 Years.  Comments:  3/28/2011 5:48 PM Denise Sanches  Failure to progress.  D&C - Dilatation and curettage (SNOMED CT 0017919234) on 7/3/2000 at 20 Years.  Comments:  3/28/2011 5:48 PM Denise Sanches  Miscarriage  LASIK (SNOMED CT 3669829260).   Social History:        Electronic Cigarette/Vaping Assessment            Electronic Cigarette Use: Never.      Alcohol Assessment            1-2 times per week      Tobacco Assessment            Former smoker, quit more than 30 days ago, Cigarettes      Substance Abuse Assessment            Never      Employment and Education Assessment            Employed, Work/School  description: .      Home and Environment Assessment            Marital status:  (Living together).  Spouse/Partner name: Ariel Gomez.  Risks in environment: owns               secured gun.      Nutrition and Health Assessment            Type of diet: Regular.      Exercise and Physical Activity Assessment            Exercise frequency: Daily.  Exercise type: cardio, strength, stretch.      Sexual Assessment            Sexually active: Yes.  Sexual orientation: Heterosexual.  Contraceptive Use Details:  vasectomy.        Physical Examination   Vital Signs   1/26/2022 7:04 AM CST Temperature Tympanic 97.9 DegF    Peripheral Pulse Rate 71 bpm    Systolic Blood Pressure 136 mmHg  HI    Diastolic Blood Pressure 86 mmHg  HI    Mean Arterial Pressure 103 mmHg    BP Site Right arm    BP Method Manual    Oxygen Saturation 99 %      Measurements from flowsheet : Measurements   1/26/2022 7:04 AM CST Height Measured - Standard 65.5 in    Weight Measured - Standard 155.9 lb    BSA 1.81 m2    Body Mass Index 25.55 kg/m2  HI      General:  Alert and oriented, No acute distress, vital signs stable, as noted above.    Eye:  Pupils are equal, round and reactive to light, Extraocular movements are intact, Normal conjunctiva.    HENT:  Normocephalic, Tympanic membranes are clear, Normal hearing.    Neck:  Supple, Non-tender, No lymphadenopathy, No thyromegaly.    Respiratory:  Lungs are clear to auscultation, Respirations are non-labored, Breath sounds are equal, Symmetrical chest wall expansion.    Cardiovascular:  Normal rate, Regular rhythm, No murmur, No edema.    Breast:  No mass, No tenderness, No discharge, Breasts examined .  No infra nor supraclavicular nodes palpable.  No axillary nodes or masses palpable.  No nipple discharge. Breasts normal throughout.    Gastrointestinal:  Soft, Non-tender, Non-distended, No organomegaly.    Genitourinary:  Normal genitalia for age and sex, No inguinal  tenderness, No urethral discharge, No lesions.         Perineum: Within normal limits.         Groin/ inguinal region: Within normal limits.         Urethra: Within normal limits.         Vagina: Within normal limits.         Labia: Within normal limits.         Cervix: Within normal limits.         Uterus: Within normal limits.         Adnexa: Within normal limits.    Lymphatics:  no axillary, no supra or infraclavicular nor inguinal lymphadenopathy palpable.    Musculoskeletal:  Normal range of motion, Normal strength, No deformity, Normal gait, there is a small 2-3 mm mobile cyst palpable just distal on palmar aspect of left indext finger of MP joint.    Integumentary:  Warm, Dry, Pink, Intact, No rash.    Neurologic:  Alert, Oriented, Normal sensory, Normal motor function, Cranial Nerves II-XII are grossly intact.    Psychiatric:  Cooperative, Appropriate mood & affect, Normal judgment, PHQ 9/CAGE questionaire reviewed and discussed with patient,  see score.       Impression and Plan   Diagnosis     Well adult (CZA77-UL Z00.00).     Depression, Recurrent (BVT15-UE F33.0).     Ganglion cyst of finger (CWU28-AW M67.449).     Patient Instructions:       Counseled: Patient, Regarding diagnosis, Regarding medications, Verbalized understanding, counseled on health benefits of healthy weight, regular exercise, healthy diet, will see ortho hand to discuss treatment option risk/benefits of cyst removal  continue sretraline, desires same dose, symtpoms well controlled.    Orders     Orders (Selected)   Outpatient Orders  Ordered (Dispatched)  ThinPrep Imaging Pap and HPV mRNA E6/E7* ([facility]): Specimen Type: Pap, Collection Date: 01/26/22 7:22:00 CST  Prescriptions  Prescribed  sertraline 100 mg oral tablet: = 1 tab(s) ( 100 mg ), Oral, daily, # 90 tab(s), 3 Refill(s), Type: Maintenance, Pharmacy: [facility], \, 1 tab(s) Oral daily, 65.5, in, 01/26/22 7:04:00 CST, Height Measured, 155.9, lb,  01/26/22 7:04:00 CST, Weight Measured.

## 2022-03-02 NOTE — TELEPHONE ENCOUNTER
---------------------  From: Sheryl Gallardo RN (Phone Messages Pool ([id number]_[facility]))   Sent: 1/4/2022 12:53:06 PM CST  Subject: sertraline       PCP:  MICK      Time of Call:  12:41pm       Person Calling:  pt  Phone number:  [phone]    Note:   Pt called in, stating her pharmacy has not received refill from [facility] for sertraline.   Chart reviewed; pt is due for annual visit and was informed. pt declined transfer to scheduling and will call back and make appt.  sertraline 100mg 1 tab QD #30 sent per protocol.    Last office visit and reason:10/29/21 tendonitis NCB

## 2022-03-02 NOTE — NURSING NOTE
Depression Screening Entered On:  1/26/2022 7:48 AM CST    Performed On:  1/26/2022 7:48 AM CST by Reta Londono LPN               Depression Screening   Little Interest - Pleasure in Activities :   Not at all   Feeling Down, Depressed, Hopeless :   Not at all   Initial Depression Screen Score :   0 Score   Poor Appetite or Overeating :   Not at all   Trouble Falling or Staying Asleep :   Several days   Feeling Tired or Little Energy :   Several days   Feeling Bad About Yourself :   Not at all   Trouble Concentrating :   Not at all   Moving or Speaking Slowly :   Not at all   Thoughts Better Off Dead or Hurting Self :   Not at all   Difficulty at Work, Home, Getting Along :   Not difficult at all   Detailed Depression Screen Score :   2    Total Depression Screen Score :   2    Reta Londono LPN - 1/26/2022 7:48 AM CST

## 2022-03-02 NOTE — TELEPHONE ENCOUNTER
---------------------  From: Joana Louis   To: IZZY ALLEN    Sent: 1/28/2022 10:41:18 AM CST  Subject: General Message       Izzy,  Your pap smear is normal and the screening test for high risk strains of human papilloma virus is negative.  These tests should be repeated in 5 years.  You should return in one year for your annual physical , but a pap smear will not be necessary at that time.    IRA Redd

## 2022-03-02 NOTE — LETTER
(Inserted Image. Unable to display)                       February 23, 2022      CASS [place]EN  [place] [address]        Dear CASS,      Thank you for selecting [facility] for your healthcare needs.       This letter is to inform you that I have received a copy of your mammogram results.  Your results were NORMAL.  You will also receive notice of your results from the radiologist within 7-10 days.  This letter is to let you know I have also received a copy and it is filed in your clinic chart.    Please plan on having your next mammogram done in 12 months.  Thank you.          Please contact me or my assistant at [phone] if you have any questions or concerns.     Sincerely,        POORNIMA Redd-NP  Family Nurse Practitioner

## 2022-03-02 NOTE — TELEPHONE ENCOUNTER
Entered by Reta Londono LPN on January 05, 2022 12:53:46 PM CST  Rx was already approved 01/04/2022 x1 month per protocol.  Last visit 10/29/2021 - tendonitis  Due for annual exam. RTC order placed.      ** Patient matched by Reta Londono LPN on 1/5/2022 12:51:39 PM CST **      ------------------------------------------  From: [facility]  To: Joana Louis  Sent: January 3, 2022 6:59:30 AM CST  Subject: Medication Management  Due: December 15, 2021 8:20:29 PM CST     ** On Hold Pending Signature **     Drug: sertraline (sertraline 100 mg oral tablet), TAKE ONE TABLET BY MOUTH EVERY DAY  Quantity: 90 unknown unit  Days Supply: 90  Refills: 2  Substitutions Allowed  Notes from Pharmacy:     Dispensed Drug: sertraline (sertraline 100 mg oral tablet), TAKE ONE TABLET BY MOUTH EVERY DAY  Quantity: 90 unknown unit  Days Supply: 90  Refills: 3  Substitutions Allowed  Notes from Pharmacy:  ---------------------------------------------------------------  From: Reta Londono LPN   To: [facility]    Sent: 1/5/2022 12:55:17 PM CST  Subject: Medication Management     ** Not Approved: Refill not appropriate, rx sent 01/04/2022 **  sertraline (SERTRALINE HCL 100MG TABS)  TAKE ONE TABLET BY MOUTH EVERY DAY  Qty:  90 unknown unit        Days Supply:  90        Refills:  3          Substitutions Allowed     Route To Pharmacy - [facility]   Signed by Reta Londono LPN

## 2022-03-02 NOTE — NURSING NOTE
Comprehensive Intake Entered On:  1/26/2022 7:08 AM CST    Performed On:  1/26/2022 7:04 AM CST by Reta Londono LPN               Summary   Chief Complaint :   annual exam   Last Menstrual Period :   1/5/2022 CST   Menstrual Status :   Menarcheal   Weight Measured :   155.9 lb(Converted to: 155 lb 14 oz, 70.715 kg)    Height Measured :   65.5 in(Converted to: 5 ft 5 in, 166.37 cm)    Body Mass Index :   25.55 kg/m2 (HI)    Body Surface Area :   1.81 m2   Systolic Blood Pressure :   136 mmHg (HI)    Diastolic Blood Pressure :   86 mmHg (HI)    Mean Arterial Pressure :   103 mmHg   Peripheral Pulse Rate :   71 bpm   BP Site :   Right arm   BP Method :   Manual   Temperature Tympanic :   97.9 DegF(Converted to: 36.6 DegC)    Oxygen Saturation :   99 %   Race :      Languages :   English   Ethnicity :   Not  or    Reta Londono LPN - 1/26/2022 7:04 AM CST   Health Status   Allergies Verified? :   Yes   Medication History Verified? :   Yes   Medical History Verified? :   No   Pre-Visit Planning Status :   Completed   Tobacco Use? :   Former smoker   Reta Londono LPN - 1/26/2022 7:04 AM CST   Meds / Allergies   (As Of: 1/26/2022 7:08:16 AM CST)   Allergies (Active)   Percocet 10/325  Estimated Onset Date:   Unspecified ; Reactions:   Itchy ; Created By:   Kathi Levine; Reaction Status:   Active ; Category:   Drug ; Substance:   Percocet 10/325 ; Type:   Allergy ; Updated By:   Kathi Levine; Reviewed Date:   10/29/2021 7:29 AM CDT        Medication List   (As Of: 1/26/2022 7:08:16 AM CST)   Prescription/Discharge Order    sertraline  :   sertraline ; Status:   Prescribed ; Ordered As Mnemonic:   sertraline 100 mg oral tablet ; Simple Display Line:   100 mg, 1 tab(s), Oral, daily, 30 tab(s), 0 Refill(s) ; Ordering Provider:   Joana Louis; Catalog Code:   sertraline ; Order Dt/Tm:   1/4/2022 12:43:10 PM CST            Social History   Social History   (As Of: 1/26/2022  7:08:16 AM CST)   Alcohol:        1-2 times per week   (Last Updated: 3/29/2011 4:16:41 PM CDT by Jolene Aguila CMA)          Tobacco:        Former smoker, quit more than 30 days ago, Cigarettes   (Last Updated: 1/26/2022 7:05:40 AM CST by Reta Londono LPN)          Electronic Cigarette/Vaping:        Electronic Cigarette Use: Never.   (Last Updated: 1/26/2022 7:05:43 AM CST by Reta Londono LPN)          Substance Abuse:        Never   (Last Updated: 8/28/2015 12:00:25 PM CDT by Roxanna Cruz CMA)          Employment/School:        Employed, Work/School description: .   (Last Updated: 8/28/2015 12:00:41 PM CDT by Roxanna Cruz CMA)          Home/Environment:        Marital status:  (Living together).  Spouse/Partner name: Ariel Gomez.  Risks in environment: owns secured gun.   (Last Updated: 10/31/2017 1:55:38 PM CDT by Juliette Stewart)          Nutrition/Health:        Type of diet: Regular.   (Last Updated: 8/28/2015 12:01:05 PM CDT by Roxanna Cruz CMA)          Exercise:        Exercise frequency: Daily.  Exercise type: cardio, strength, stretch.   (Last Updated: 10/31/2017 1:56:11 PM CDT by Juliette Stewart)          Sexual:        Sexually active: Yes.  Sexual orientation: Heterosexual.  Contraceptive Use Details:  vasectomy.   (Last Updated: 9/28/2016 9:35:47 AM CDT by Roxanna Cruz CMA)

## 2022-03-02 NOTE — PROCEDURES
Accession Number:       [id number]  CLINICAL INFORMATION::     None given  LMP::     146841  PREV. PAP::     09/23/2016 WNL  PREV. BX::     NONE GIVEN  SOURCE::     None given  STATEMENT OF ADEQUACY::     Satisfactory for evaluation. Endocervical/transformation zone component present.  INTERPRETATION/RESULT::     Negative for intraepithelial lesion or malignancy.  COMMENT::     This Pap test has been evaluated with computer assisted technology.  CYTOTECHNOLOGIST::     CARLITA WORTHY(ASCP) CT Screening location: [place] [address]  COMMENT:     See comment       EXPLANATORY NOTE:         The Pap is a screening test for cervical cancer. It is       not a diagnostic test and is subject to false negative       and false positive results. It is most reliable when a       satisfactory sample, regularly obtained, is submitted       with relevant clinical findings and history, and when       the Pap result is evaluated along with historic and       current clinical information.  HPV mRNA E6/E7:     Not Detected       Methodology: Transcription-Mediated Amplification       This assay detects E6/E7 viral messenger RNA (mRNA) from 14       high-risk HPV types (16,18,31,33,35,39,45,51,52,56,58,59,66,68).           The analytical performance characteristics of this       assay have been determined by [employer].       The modifications have not been cleared or approved       by the FDA. This assay has been validated pursuant       to the CLIA regulations and is used for       clinical purposes.         For additional information, please refer to       http://education.[employer].Ranku/faq/OUY079a3       (This link if provided for information/       educational purposes only.)

## 2022-04-26 ENCOUNTER — TRANSFERRED RECORDS (OUTPATIENT)
Dept: HEALTH INFORMATION MANAGEMENT | Facility: CLINIC | Age: 42
End: 2022-04-26
Payer: COMMERCIAL

## 2022-04-26 ENCOUNTER — ANCILLARY PROCEDURE (OUTPATIENT)
Dept: GENERAL RADIOLOGY | Facility: CLINIC | Age: 42
End: 2022-04-26
Attending: ORTHOPAEDIC SURGERY
Payer: COMMERCIAL

## 2022-04-26 ENCOUNTER — MEDICAL CORRESPONDENCE (OUTPATIENT)
Dept: HEALTH INFORMATION MANAGEMENT | Facility: CLINIC | Age: 42
End: 2022-04-26
Payer: COMMERCIAL

## 2022-04-26 DIAGNOSIS — M79.646 FINGER PAIN: ICD-10-CM

## 2022-07-15 ENCOUNTER — TRANSFERRED RECORDS (OUTPATIENT)
Dept: HEALTH INFORMATION MANAGEMENT | Facility: CLINIC | Age: 42
End: 2022-07-15

## 2022-07-15 ENCOUNTER — LAB REQUISITION (OUTPATIENT)
Dept: LAB | Facility: CLINIC | Age: 42
End: 2022-07-15

## 2022-07-15 DIAGNOSIS — R22.32 LOCALIZED SWELLING, MASS AND LUMP, LEFT UPPER LIMB: ICD-10-CM

## 2022-07-15 PROCEDURE — 88305 TISSUE EXAM BY PATHOLOGIST: CPT | Mod: TC,ORL | Performed by: ORTHOPAEDIC SURGERY

## 2022-07-15 PROCEDURE — 88305 TISSUE EXAM BY PATHOLOGIST: CPT | Mod: 26 | Performed by: PATHOLOGY

## 2022-07-15 PROCEDURE — 88342 IMHCHEM/IMCYTCHM 1ST ANTB: CPT | Mod: 26 | Performed by: PATHOLOGY

## 2022-07-15 PROCEDURE — 88341 IMHCHEM/IMCYTCHM EA ADD ANTB: CPT | Mod: 26 | Performed by: PATHOLOGY

## 2022-07-20 LAB
PATH REPORT.COMMENTS IMP SPEC: NORMAL
PATH REPORT.FINAL DX SPEC: NORMAL
PATH REPORT.GROSS SPEC: NORMAL
PATH REPORT.MICROSCOPIC SPEC OTHER STN: NORMAL
PATH REPORT.RELEVANT HX SPEC: NORMAL
PHOTO IMAGE: NORMAL

## 2022-07-23 ENCOUNTER — HEALTH MAINTENANCE LETTER (OUTPATIENT)
Age: 42
End: 2022-07-23

## 2022-07-26 ENCOUNTER — TRANSFERRED RECORDS (OUTPATIENT)
Dept: HEALTH INFORMATION MANAGEMENT | Facility: CLINIC | Age: 42
End: 2022-07-26

## 2022-09-12 ENCOUNTER — TRANSFERRED RECORDS (OUTPATIENT)
Dept: HEALTH INFORMATION MANAGEMENT | Facility: CLINIC | Age: 42
End: 2022-09-12

## 2022-10-01 ENCOUNTER — HEALTH MAINTENANCE LETTER (OUTPATIENT)
Age: 42
End: 2022-10-01

## 2022-10-24 ENCOUNTER — TRANSFERRED RECORDS (OUTPATIENT)
Dept: HEALTH INFORMATION MANAGEMENT | Facility: CLINIC | Age: 42
End: 2022-10-24

## 2022-10-24 ENCOUNTER — ANCILLARY PROCEDURE (OUTPATIENT)
Dept: GENERAL RADIOLOGY | Facility: CLINIC | Age: 42
End: 2022-10-24
Attending: STUDENT IN AN ORGANIZED HEALTH CARE EDUCATION/TRAINING PROGRAM
Payer: COMMERCIAL

## 2022-10-24 DIAGNOSIS — M25.511 RIGHT SHOULDER PAIN: ICD-10-CM

## 2022-12-22 ENCOUNTER — TRANSFERRED RECORDS (OUTPATIENT)
Dept: HEALTH INFORMATION MANAGEMENT | Facility: CLINIC | Age: 42
End: 2022-12-22

## 2023-01-26 DIAGNOSIS — F32.A DEPRESSION: Primary | ICD-10-CM

## 2023-01-26 RX ORDER — SERTRALINE HYDROCHLORIDE 100 MG/1
TABLET, FILM COATED ORAL
Qty: 30 TABLET | Refills: 0 | Status: SHIPPED | OUTPATIENT
Start: 2023-01-26 | End: 2023-01-31

## 2023-01-26 NOTE — TELEPHONE ENCOUNTER
"      Last office visit: 1/26/22      Future Appointments 1/26/2023 - 7/25/2023      Date Visit Type Length Department Provider     1/31/2023  8:30 AM PREVENTATIVE ADULT            30 min RVFL FP/IM/Joana Ahumada NP    Location Instructions:     [facility] is located at [address] [place] [address], one block north of [facility] and the [facility]. The clinic shares a building with the [facility]; use the clinic s parking lot and entrance on the building s south side.                    Requested Prescriptions   Pending Prescriptions Disp Refills     sertraline (ZOLOFT) 100 MG tablet [Pharmacy Med Name: SERTRALINE HCL 100MG TABS] 90 tablet 3     Sig: TAKE ONE TABLET BY MOUTH EVERY DAY       SSRIs Protocol Failed - 1/26/2023  7:55 AM        Failed - Medication is active on med list        Passed - Recent (12 mo) or future (30 days) visit within the authorizing provider's specialty     Patient has had an office visit with the authorizing provider or a provider within the authorizing providers department within the previous 12 mos or has a future within next 30 days. See \"Patient Info\" tab in inbasket, or \"Choose Columns\" in Meds & Orders section of the refill encounter.              Passed - Patient is age 18 or older        Passed - No active pregnancy on record        Passed - No positive pregnancy test in last 12 months                   "

## 2023-01-30 PROBLEM — K21.9 GASTROESOPHAGEAL REFLUX DISEASE: Status: ACTIVE | Noted: 2023-01-30

## 2023-01-30 PROBLEM — E78.5 DYSLIPIDEMIA: Status: ACTIVE | Noted: 2023-01-30

## 2023-01-30 PROBLEM — F33.0 MILD EPISODE OF RECURRENT MAJOR DEPRESSIVE DISORDER (H): Status: ACTIVE | Noted: 2023-01-30

## 2023-01-30 PROBLEM — R61 HYPERHIDROSIS: Status: ACTIVE | Noted: 2023-01-30

## 2023-01-30 RX ORDER — SERTRALINE HYDROCHLORIDE 100 MG/1
100 TABLET, FILM COATED ORAL DAILY
COMMUNITY
Start: 2021-01-04 | End: 2023-01-31

## 2023-01-30 ASSESSMENT — ENCOUNTER SYMPTOMS
SHORTNESS OF BREATH: 0
HEMATOCHEZIA: 0
FREQUENCY: 0
DYSURIA: 0
HEMATURIA: 0
WEAKNESS: 0
DIARRHEA: 0
CONSTIPATION: 0
NAUSEA: 0
HEARTBURN: 0
NERVOUS/ANXIOUS: 0
DIZZINESS: 0
ABDOMINAL PAIN: 0
FEVER: 0
JOINT SWELLING: 0
HEADACHES: 0
CHILLS: 0
BREAST MASS: 0
ARTHRALGIAS: 0
COUGH: 0
EYE PAIN: 0
PARESTHESIAS: 0
SORE THROAT: 0
MYALGIAS: 0
PALPITATIONS: 0

## 2023-01-31 ENCOUNTER — OFFICE VISIT (OUTPATIENT)
Dept: FAMILY MEDICINE | Facility: CLINIC | Age: 43
End: 2023-01-31
Payer: COMMERCIAL

## 2023-01-31 VITALS
TEMPERATURE: 97.8 F | HEIGHT: 65 IN | DIASTOLIC BLOOD PRESSURE: 90 MMHG | WEIGHT: 160.2 LBS | BODY MASS INDEX: 26.69 KG/M2 | OXYGEN SATURATION: 98 % | HEART RATE: 78 BPM | SYSTOLIC BLOOD PRESSURE: 138 MMHG

## 2023-01-31 DIAGNOSIS — F33.0 MILD EPISODE OF RECURRENT MAJOR DEPRESSIVE DISORDER (H): ICD-10-CM

## 2023-01-31 DIAGNOSIS — Z11.59 NEED FOR HEPATITIS C SCREENING TEST: ICD-10-CM

## 2023-01-31 DIAGNOSIS — Z11.4 SCREENING FOR HIV (HUMAN IMMUNODEFICIENCY VIRUS): ICD-10-CM

## 2023-01-31 DIAGNOSIS — R03.0 SINGLE EPISODE OF ELEVATED BLOOD PRESSURE: ICD-10-CM

## 2023-01-31 DIAGNOSIS — Z00.00 ROUTINE GENERAL MEDICAL EXAMINATION AT A HEALTH CARE FACILITY: ICD-10-CM

## 2023-01-31 DIAGNOSIS — N95.1 MENOPAUSAL SYNDROME (HOT FLASHES): Primary | ICD-10-CM

## 2023-01-31 LAB
ANION GAP SERPL CALCULATED.3IONS-SCNC: 13 MMOL/L (ref 7–15)
BUN SERPL-MCNC: 14.5 MG/DL (ref 6–20)
CALCIUM SERPL-MCNC: 10.1 MG/DL (ref 8.6–10)
CHLORIDE SERPL-SCNC: 103 MMOL/L (ref 98–107)
CHOLEST SERPL-MCNC: 315 MG/DL
CREAT SERPL-MCNC: 0.68 MG/DL (ref 0.51–0.95)
DEPRECATED CALCIDIOL+CALCIFEROL SERPL-MC: 35 UG/L (ref 20–75)
DEPRECATED HCO3 PLAS-SCNC: 25 MMOL/L (ref 22–29)
GFR SERPL CREATININE-BSD FRML MDRD: >90 ML/MIN/1.73M2
GLUCOSE SERPL-MCNC: 95 MG/DL (ref 70–99)
HCV AB SERPL QL IA: NONREACTIVE
HDLC SERPL-MCNC: 96 MG/DL
HIV 1+2 AB+HIV1 P24 AG SERPL QL IA: NONREACTIVE
LDLC SERPL CALC-MCNC: 196 MG/DL
NONHDLC SERPL-MCNC: 219 MG/DL
POTASSIUM SERPL-SCNC: 4.5 MMOL/L (ref 3.4–5.3)
SODIUM SERPL-SCNC: 141 MMOL/L (ref 136–145)
TRIGL SERPL-MCNC: 115 MG/DL
TSH SERPL DL<=0.005 MIU/L-ACNC: 2.82 UIU/ML (ref 0.3–4.2)

## 2023-01-31 PROCEDURE — 36415 COLL VENOUS BLD VENIPUNCTURE: CPT | Performed by: NURSE PRACTITIONER

## 2023-01-31 PROCEDURE — 82306 VITAMIN D 25 HYDROXY: CPT | Performed by: NURSE PRACTITIONER

## 2023-01-31 PROCEDURE — 99214 OFFICE O/P EST MOD 30 MIN: CPT | Mod: 25 | Performed by: NURSE PRACTITIONER

## 2023-01-31 PROCEDURE — 80048 BASIC METABOLIC PNL TOTAL CA: CPT | Performed by: NURSE PRACTITIONER

## 2023-01-31 PROCEDURE — 86803 HEPATITIS C AB TEST: CPT | Performed by: NURSE PRACTITIONER

## 2023-01-31 PROCEDURE — 84443 ASSAY THYROID STIM HORMONE: CPT | Performed by: NURSE PRACTITIONER

## 2023-01-31 PROCEDURE — 87389 HIV-1 AG W/HIV-1&-2 AB AG IA: CPT | Performed by: NURSE PRACTITIONER

## 2023-01-31 PROCEDURE — 99396 PREV VISIT EST AGE 40-64: CPT | Performed by: NURSE PRACTITIONER

## 2023-01-31 PROCEDURE — 80061 LIPID PANEL: CPT | Performed by: NURSE PRACTITIONER

## 2023-01-31 RX ORDER — PROGESTERONE 100 MG/1
100 CAPSULE ORAL DAILY
Qty: 90 CAPSULE | Refills: 3 | Status: SHIPPED | OUTPATIENT
Start: 2023-01-31 | End: 2023-08-24

## 2023-01-31 RX ORDER — SERTRALINE HYDROCHLORIDE 100 MG/1
100 TABLET, FILM COATED ORAL DAILY
Qty: 90 TABLET | Refills: 3 | Status: CANCELLED | OUTPATIENT
Start: 2023-01-31 | End: 2023-05-01

## 2023-01-31 RX ORDER — ESTRADIOL 0.05 MG/D
1 PATCH TRANSDERMAL WEEKLY
Qty: 3 PATCH | Refills: 3 | Status: SHIPPED | OUTPATIENT
Start: 2023-01-31 | End: 2023-08-24

## 2023-01-31 RX ORDER — SERTRALINE HYDROCHLORIDE 100 MG/1
100 TABLET, FILM COATED ORAL DAILY
Qty: 90 TABLET | Refills: 3 | Status: SHIPPED | OUTPATIENT
Start: 2023-01-31 | End: 2024-02-21

## 2023-01-31 ASSESSMENT — ENCOUNTER SYMPTOMS
SORE THROAT: 0
COUGH: 0
BREAST MASS: 0
ARTHRALGIAS: 0
NAUSEA: 0
EYE PAIN: 0
FEVER: 0
ABDOMINAL PAIN: 0
HEMATURIA: 0
SHORTNESS OF BREATH: 0
WEAKNESS: 0
DYSURIA: 0
HEMATOCHEZIA: 0
CHILLS: 0
DIZZINESS: 0
PALPITATIONS: 0
NERVOUS/ANXIOUS: 0
CONSTIPATION: 0
FREQUENCY: 0
MYALGIAS: 0
PARESTHESIAS: 0
DIARRHEA: 0
HEADACHES: 0
HEARTBURN: 0
JOINT SWELLING: 0

## 2023-01-31 ASSESSMENT — PATIENT HEALTH QUESTIONNAIRE - PHQ9
SUM OF ALL RESPONSES TO PHQ QUESTIONS 1-9: 0
10. IF YOU CHECKED OFF ANY PROBLEMS, HOW DIFFICULT HAVE THESE PROBLEMS MADE IT FOR YOU TO DO YOUR WORK, TAKE CARE OF THINGS AT HOME, OR GET ALONG WITH OTHER PEOPLE: NOT DIFFICULT AT ALL
SUM OF ALL RESPONSES TO PHQ QUESTIONS 1-9: 0

## 2023-01-31 NOTE — LETTER
My Depression Action Plan  Name: Izzy Gomez   Date of Birth 1980  Date: 1/31/2023    My doctor: Joana Winston   My clinic: [address] [address] [phone]          GREEN    ZONE   Good Control    What it looks like:     Things are going generally well. You have normal ups and downs. You may even feel depressed from time to time, but bad moods usually last less than a day.   What you need to do:  1. Continue to care for yourself (see self care plan)  2. Check your depression survival kit and update it as needed  3. Follow your physician s recommendations including any medication.  4. Do not stop taking medication unless you consult with your physician first.           YELLOW         ZONE Getting Worse    What it looks like:     Depression is starting to interfere with your life.     It may be hard to get out of bed; you may be starting to isolate yourself from others.    Symptoms of depression are starting to last most all day and this has happened for several days.     You may have suicidal thoughts but they are not constant.   What you need to do:     1. Call your care team. Your response to treatment will improve if you keep your care team informed of your progress. Yellow periods are signs an adjustment may need to be made.     2. Continue your self-care.  Just get dressed and ready for the day.  Don't give yourself time to talk yourself out of it.    3. Talk to someone in your support network.    4. Open up your Depression Self-Care Plan/Wellness Kit.           RED    ZONE Medical Alert - Get Help    What it looks like:     Depression is seriously interfering with your life.     You may experience these or other symptoms: You can t get out of bed most days, can t work or engage in other necessary activities, you have trouble taking care of basic hygiene, or basic responsibilities, thoughts of suicide or death that will  not go away, self-injurious behavior.     What you need to do:  1. Call your care team and request a same-day appointment. If they are not available (weekends or after hours) call your local crisis line, emergency room or 911.          Depression Self-Care Plan / Wellness Kit    Many people find that medication and therapy are helpful treatments for managing depression. In addition, making small changes to your everyday life can help to boost your mood and improve your wellbeing. Below are some tips for you to consider. Be sure to talk with your medical provider and/or behavioral health consultant if your symptoms are worsening or not improving.     Sleep   Sleep hygiene  means all of the habits that support good, restful sleep. It includes maintaining a consistent bedtime and wake time, using your bedroom only for sleeping or sex, and keeping the bedroom dark and free of distractions like a computer, smartphone, or television.     Develop a Healthy Routine  Maintain good hygiene. Get out of bed in the morning, make your bed, brush your teeth, take a shower, and get dressed. Don t spend too much time viewing media that makes you feel stressed. Find time to relax each day.    Exercise  Get some form of exercise every day. This will help reduce pain and release endorphins, the  feel good  chemicals in your brain. It can be as simple as just going for a walk or doing some gardening, anything that will get you moving.      Diet  Strive to eat healthy foods, including fruits and vegetables. Drink plenty of water. Avoid excessive sugar, caffeine, alcohol, and other mood-altering substances.     Stay Connected with Others  Stay in touch with friends and family members.    Manage Your Mood  Try deep breathing, massage therapy, biofeedback, or meditation. Take part in fun activities when you can. Try to find something to smile about each day.     Psychotherapy  Be open to working with a therapist if your provider recommends  it.     Medication  Be sure to take your medication as prescribed. Most anti-depressants need to be taken every day. It usually takes several weeks for medications to work. Not all medicines work for all people. It is important to follow-up with your provider to make sure you have a treatment plan that is working for you. Do not stop your medication abruptly without first discussing it with your provider.    Crisis Resources   These hotlines are for both adults and children. They and are open 24 hours a day, 7 days a week unless noted otherwise.      National Suicide Prevention Lifeline   988 or 3-942-488-XDJD (9650)      Crisis Text Line    www.crisistextline.org  Text HOME to 568221 from anywhere in the [place], anytime, about any type of crisis. A live, trained crisis counselor will receive the text and respond quickly.      [employer] Lifeline for LGBTQ Youth  A national crisis intervention and suicide lifeline for LGBTQ youth under 25. Provides a safe place to talk without judgement. Call 1-684.951.1363; text START to 639744 or visit www.thetrevorproject.org to talk to a trained counselor.      For [facility] crisis numbers, visit the [facility] website at:  https://[place].gov/dhs/people-we-serve/adults/health-care/mental-health/resources/crisis-contacts.jsp

## 2023-01-31 NOTE — PROGRESS NOTES
SUBJECTIVE:   CC: Izzy is an 42 year old who presents for preventive health visit.     ,  with vasectomy, hosting family from [facility]  Continues to be physically active with healthy weight and healthy diet however her blood pressure is climbing.  Her family has a strong family history of elevated blood pressure    She is interested in checking hormones.  She has a friend's health care provider who uses estrogen and testosterone.  Izzy has no contraindications to using estrogen.  Her hot flashes were day and night and impacting her work as well as her sleep.  She is having periods but they are very unpredictable.  Last menstrual period was probably 2 or 3 months ago.  Pap is up-to-date.  She is due for her mammogram in March and she will get that scheduled    She has depression that is very well controlled.  See her PHQ-9 score of 0.  She would like to stay on her current dose of sertraline    Not declines COVID vaccine update, she had 3 COVID vaccines.  She would like a flu shot today  No family history of colon cancer  Her Pap smear is up-to-date    Healthy Habits:     Getting at least 3 servings of Calcium per day:  Yes    Bi-annual eye exam:  NO    Dental care twice a year:  Yes    Sleep apnea or symptoms of sleep apnea:  None    Diet:  Regular (no restrictions)    Frequency of exercise:  6-7 days/week    Duration of exercise:  45-60 minutes    Taking medications regularly:  Yes    Medication side effects:  None    PHQ-2 Total Score: 0    Additional concerns today:  Yes          Today's PHQ-2 Score:   PHQ-2 ( 1999 Pfizer) 1/30/2023   Q1: Little interest or pleasure in doing things 0   Q2: Feeling down, depressed or hopeless 0   PHQ-2 Score 0   Q1: Little interest or pleasure in doing things Not at all   Q2: Feeling down, depressed or hopeless Not at all   PHQ-2 Score 0     PHQ-9 score:    PHQ 1/31/2023   PHQ-9 Total Score 0   Q9: Thoughts of better off dead/self-harm past 2 weeks Not at all              Have you ever done Advance Care Planning? (For example, a Health Directive, POLST, or a discussion with a medical provider or your loved ones about your wishes): Yes, patient states has an Advance Care Planning document and will bring a copy to the clinic.    Social History     Tobacco Use     Smoking status: Former     Types: Cigarettes     Quit date:      Years since quittin.0     Smokeless tobacco: Never   Substance Use Topics     Alcohol use: Not on file         Alcohol Use 2023   Prescreen: >3 drinks/day or >7 drinks/week? Not Applicable     Reviewed orders with patient.  Reviewed health maintenance and updated orders accordingly - Yes  Lab work is in process    Breast Cancer Screening:    FHS-7:   Breast CA Risk Assessment (FHS-7) 2023   Did any of your first-degree relatives have breast or ovarian cancer? No   Did any of your relatives have bilateral breast cancer? No   Did any man in your family have breast cancer? No   Did any woman in your family have breast and ovarian cancer? No   Did any woman in your family have breast cancer before age 50 y? No   Do you have 2 or more relatives with breast and/or ovarian cancer? No   Do you have 2 or more relatives with breast and/or bowel cancer? No       Mammogram Screening - Offered annual screening and updated Health Maintenance for mutual plan based on risk factor consideration    Pertinent mammograms are reviewed under the imaging tab.    History of abnormal Pap smear: NO - age 30-65 PAP every 5 years with negative HPV co-testing recommended     Reviewed and updated as needed this visit by clinical staff   Tobacco  Allergies  Meds              Reviewed and updated as needed this visit by Provider                     Review of Systems   Constitutional: Negative for chills and fever.   HENT: Negative for congestion, ear pain, hearing loss and sore throat.    Eyes: Negative for pain and visual disturbance.   Respiratory: Negative  "for cough and shortness of breath.    Cardiovascular: Negative for chest pain, palpitations and peripheral edema.   Gastrointestinal: Negative for abdominal pain, constipation, diarrhea, heartburn, hematochezia and nausea.   Breasts:  Negative for tenderness, breast mass and discharge.   Genitourinary: Negative for dysuria, frequency, genital sores, hematuria, pelvic pain, urgency, vaginal bleeding and vaginal discharge.   Musculoskeletal: Negative for arthralgias, joint swelling and myalgias.   Skin: Negative for rash.   Neurological: Negative for dizziness, weakness, headaches and paresthesias.   Psychiatric/Behavioral: Negative for mood changes. The patient is not nervous/anxious.           OBJECTIVE:   BP (!) 144/88 (BP Location: Right arm, Patient Position: Sitting)   Pulse 78   Temp 97.8  F (36.6  C)   Ht 1.657 m (5' 5.25\")   Wt 72.7 kg (160 lb 3.2 oz)   SpO2 98%   BMI 26.45 kg/m    Physical Exam  GENERAL: healthy, alert and no distress  EYES: Eyes grossly normal to inspection, PERRL and conjunctivae and sclerae normal  HENT: ear canals and TM's normal, nose and mouth without ulcers or lesions  NECK: no adenopathy, no asymmetry, masses, or scars and thyroid normal to palpation  RESP: lungs clear to auscultation - no rales, rhonchi or wheezes  BREAST: normal without masses, tenderness or nipple discharge and no palpable axillary masses or adenopathy  CV: regular rate and rhythm, normal S1 S2, no S3 or S4, no murmur, click or rub, no peripheral edema and peripheral pulses strong  ABDOMEN: soft, nontender, no hepatosplenomegaly, no masses and bowel sounds normal  MS: no gross musculoskeletal defects noted, no edema  SKIN: no suspicious lesions or rashes  NEURO: Normal strength and tone, mentation intact and speech normal  PSYCH: mentation appears normal, affect normal/bright        ASSESSMENT/PLAN:       ICD-10-CM    1. Menopausal syndrome (hot flashes)  N95.1 Vitamin D Deficiency     TSH with free T4 " reflex     Basic metabolic panel     Lipid panel reflex to direct LDL Fasting     estradiol (CLIMARA) 0.05 MG/24HR weekly patch     progesterone (PROMETRIUM) 100 MG capsule      2. Routine general medical examination at a health care facility  Z00.00 REVIEW OF HEALTH MAINTENANCE PROTOCOL ORDERS     INFLUENZA VACCINE IM > 6 MONTHS VALENT IIV4 (AFLURIA/FLUZONE)      3. Screening for HIV (human immunodeficiency virus)  Z11.4 HIV Antigen Antibody Combo      4. Need for hepatitis C screening test  Z11.59 Hepatitis C Screen Reflex to HCV RNA Quant and Genotype      5. Mild episode of recurrent major depressive disorder (H)  F33.0 sertraline (ZOLOFT) 100 MG tablet      6. Single episode of elevated blood pressure  R03.0 Lipid panel reflex to direct LDL Fasting        Additional 20 minutes with this patient spent on counseling regarding hormone replacement therapy.  We will start with daily Prometrium.  May need to change that to every 3 months to allow.  If she is having spotting.  She will follow-up in about a month to see how its going and make sure she is having some relief of her symptoms.  She is counseled on the need to take progesterone in addition to the estrogen to help reduce endometrial thickening and the risk of endometrial cancer.  Risks of blood clots breast cancer are low but these are discussed as well  Patient has been advised of split billing requirements and indicates understanding: Yes      COUNSELING:  Reviewed preventive health counseling, as reflected in patient instructions       Regular exercise       Healthy diet/nutrition       Vision screening       Contraception       Osteoporosis prevention/bone health       Colorectal Cancer Screening       Consider Hep C screening for all patients one time for ages 18-79 years       HIV screeninx in teen years, 1x in adult years, and at intervals if high risk        She reports that she quit smoking about 13 years ago. Her smoking use included  cigarettes. She has never used smokeless tobacco.      Joana Winston NP  [facility]  Answers for HPI/ROS submitted by the patient on 1/31/2023  If you checked off any problems, how difficult have these problems made it for you to do your work, take care of things at home, or get along with other people?: Not difficult at all  PHQ9 TOTAL SCORE: 0

## 2023-02-28 ENCOUNTER — OFFICE VISIT (OUTPATIENT)
Dept: FAMILY MEDICINE | Facility: CLINIC | Age: 43
End: 2023-02-28
Payer: COMMERCIAL

## 2023-02-28 VITALS
HEIGHT: 65 IN | WEIGHT: 164.4 LBS | DIASTOLIC BLOOD PRESSURE: 92 MMHG | OXYGEN SATURATION: 98 % | SYSTOLIC BLOOD PRESSURE: 146 MMHG | HEART RATE: 56 BPM | TEMPERATURE: 98.8 F | BODY MASS INDEX: 27.39 KG/M2

## 2023-02-28 DIAGNOSIS — I10 PRIMARY HYPERTENSION: ICD-10-CM

## 2023-02-28 DIAGNOSIS — N95.1 MENOPAUSAL SYNDROME (HOT FLASHES): Primary | ICD-10-CM

## 2023-02-28 PROCEDURE — 99214 OFFICE O/P EST MOD 30 MIN: CPT | Performed by: NURSE PRACTITIONER

## 2023-02-28 RX ORDER — HYDROCHLOROTHIAZIDE 12.5 MG/1
12.5 TABLET ORAL DAILY
Qty: 90 TABLET | Refills: 3 | Status: SHIPPED | OUTPATIENT
Start: 2023-02-28 | End: 2023-08-24

## 2023-02-28 RX ORDER — ESTRADIOL 0.05 MG/D
1 PATCH, EXTENDED RELEASE TRANSDERMAL
Qty: 24 PATCH | Refills: 0 | Status: CANCELLED | OUTPATIENT
Start: 2023-03-02

## 2023-02-28 RX ORDER — ESTRADIOL 0.5 MG/1
0.5 TABLET ORAL DAILY
Qty: 90 TABLET | Refills: 3 | Status: SHIPPED | OUTPATIENT
Start: 2023-02-28 | End: 2023-08-24

## 2023-02-28 NOTE — PROGRESS NOTES
"  Assessment & Plan     (N95.1) Menopausal syndrome (hot flashes)  (primary encounter diagnosis)  Comment: Appreciates large reduction in hot flashes but is frustrated with patches that do not stick.  She would like to use oral estrogen.  She understands there is increased risk of blood clot but she is at low risk for blood clot as it is  Plan: estradiol (ESTRACE) 0.5 MG tablet        Recheck in 6 weeks    (I10) Primary hypertension  Comment: I reassured her that she is doing everything possible from a lifestyle standpoint to reduce her cholesterol and hypertension but much of this is likely familial.  We will start a low-dose diuretic to improve hypertension.  This may be helpful with her weight loss.  She should stay hydrated and watch for cramping.  Follow-up in 6 weeks  Plan: hydrochlorothiazide (HYDRODIURIL) 12.5 MG         tablet                       BMI:   Estimated body mass index is 27.15 kg/m  as calculated from the following:    Height as of this encounter: 1.657 m (5' 5.25\").    Weight as of this encounter: 74.6 kg (164 lb 6.4 oz).           Return in about 8 weeks (around 4/25/2023) for Follow up, with me, in person.    Joana Winston NP  [facility]    Angy Magana is a 42 year old presenting for the following health issues: following up after starting on estrogen patches for hot flashes, she says it works but the patches don't stay on for the full week, they fall off    Started on patches for hormone replacement about a month ago and has seen great reduction in hot flashes.  She is frustrated that the patch will stay on.  Would like to switch to an oral formulation.  She did get a period about 2 weeks ago and it was a little longer and heavier than what it had been.  She is taking Prometrium nightly.  I told her we will continue to watch and see how the bleeding pattern unfold screening that she is perimenopausal    She is very frustrated with her very healthy diet " "and exercise routine yet she continues to gain weight.  We have done some lab work that showed her cholesterol numbers are high but she does have a protective HDL that is elevated.        She has strong FH HTN,--has started monitoring her blood pressures.  Last bp high, 135/86, 140/94, 142/97, 159/96.  She agrees that this puts her at risk and would like to address at  Menopausal Sx      History of Present Illness       Reason for visit:  Follow up on new meds    She eats 4 or more servings of fruits and vegetables daily.She consumes 0 sweetened beverage(s) daily.She exercises with enough effort to increase her heart rate 60 or more minutes per day.  She exercises with enough effort to increase her heart rate 7 days per week.   She is taking medications regularly.         Review of Systems         Objective    BP (!) 146/92 (BP Location: Right arm, Patient Position: Sitting)   Pulse 56   Temp 98.8  F (37.1  C)   Ht 1.657 m (5' 5.25\")   Wt 74.6 kg (164 lb 6.4 oz)   SpO2 98%   BMI 27.15 kg/m    Body mass index is 27.15 kg/m .  Physical Exam     She is alert and oriented and tearful with frustration regarding weight gain                      "

## 2023-05-09 NOTE — TELEPHONE ENCOUNTER
---------------------  From: Sharita Hedrick LPN (Phone Messages Pool ([id number]))   Sent: 12/9/2019 5:46:44 PM CST  Subject: sertaline     Phone Message    PCP:   MICK      Time of Call:  4:44pm       Person Calling:  pt  Phone number:  [phone] OK to LM    Returned call at: 5:42pm    Note:   Pt SHITAL stating she has been waiting since Wednesday for a refill of her medication. Pt says they pharmacy told her they have called a few times.     Per [facility] pool refill request was just received yesterday. No other documentation that pharmacy has contacted for refill.    Returned call and informed pt she was due in November for physical so only a 30 day supply can be sent. Pt is ok with this as she has appt scheduled the beginning of January. Rx sent.    Last office visit and reason:  11/14/18 well adult exam- female   Admission

## 2023-06-08 ENCOUNTER — TELEPHONE (OUTPATIENT)
Dept: FAMILY MEDICINE | Facility: CLINIC | Age: 43
End: 2023-06-08
Payer: COMMERCIAL

## 2023-08-24 ENCOUNTER — OFFICE VISIT (OUTPATIENT)
Dept: FAMILY MEDICINE | Facility: CLINIC | Age: 43
End: 2023-08-24
Payer: COMMERCIAL

## 2023-08-24 VITALS
RESPIRATION RATE: 16 BRPM | HEIGHT: 65 IN | DIASTOLIC BLOOD PRESSURE: 80 MMHG | BODY MASS INDEX: 25.59 KG/M2 | SYSTOLIC BLOOD PRESSURE: 140 MMHG | WEIGHT: 153.6 LBS | HEART RATE: 65 BPM | OXYGEN SATURATION: 98 %

## 2023-08-24 DIAGNOSIS — Z13.220 LIPID SCREENING: ICD-10-CM

## 2023-08-24 DIAGNOSIS — R06.00 DYSPNEA, UNSPECIFIED TYPE: Primary | ICD-10-CM

## 2023-08-24 DIAGNOSIS — I10 PRIMARY HYPERTENSION: ICD-10-CM

## 2023-08-24 DIAGNOSIS — Z82.49 FAMILY HISTORY OF ISCHEMIC HEART DISEASE: ICD-10-CM

## 2023-08-24 LAB
ANION GAP SERPL CALCULATED.3IONS-SCNC: 11 MMOL/L (ref 7–15)
BUN SERPL-MCNC: 12.9 MG/DL (ref 6–20)
CALCIUM SERPL-MCNC: 9.4 MG/DL (ref 8.6–10)
CHLORIDE SERPL-SCNC: 104 MMOL/L (ref 98–107)
CHOLEST SERPL-MCNC: 249 MG/DL
CREAT SERPL-MCNC: 0.69 MG/DL (ref 0.51–0.95)
DEPRECATED HCO3 PLAS-SCNC: 24 MMOL/L (ref 22–29)
ERYTHROCYTE [DISTWIDTH] IN BLOOD BY AUTOMATED COUNT: 15.6 % (ref 10–15)
GFR SERPL CREATININE-BSD FRML MDRD: >90 ML/MIN/1.73M2
GLUCOSE SERPL-MCNC: 87 MG/DL (ref 70–99)
HCT VFR BLD AUTO: 35.3 % (ref 35–47)
HDLC SERPL-MCNC: 74 MG/DL
HGB BLD-MCNC: 11 G/DL (ref 11.7–15.7)
LDLC SERPL CALC-MCNC: 142 MG/DL
MCH RBC QN AUTO: 24 PG (ref 26.5–33)
MCHC RBC AUTO-ENTMCNC: 31.2 G/DL (ref 31.5–36.5)
MCV RBC AUTO: 77 FL (ref 78–100)
NONHDLC SERPL-MCNC: 175 MG/DL
PLATELET # BLD AUTO: 191 10E3/UL (ref 150–450)
POTASSIUM SERPL-SCNC: 4.3 MMOL/L (ref 3.4–5.3)
RBC # BLD AUTO: 4.58 10E6/UL (ref 3.8–5.2)
SODIUM SERPL-SCNC: 139 MMOL/L (ref 136–145)
TRIGL SERPL-MCNC: 164 MG/DL
WBC # BLD AUTO: 3.7 10E3/UL (ref 4–11)

## 2023-08-24 PROCEDURE — 99214 OFFICE O/P EST MOD 30 MIN: CPT | Performed by: NURSE PRACTITIONER

## 2023-08-24 PROCEDURE — 36415 COLL VENOUS BLD VENIPUNCTURE: CPT | Performed by: NURSE PRACTITIONER

## 2023-08-24 PROCEDURE — 80048 BASIC METABOLIC PNL TOTAL CA: CPT | Performed by: NURSE PRACTITIONER

## 2023-08-24 PROCEDURE — 85027 COMPLETE CBC AUTOMATED: CPT | Performed by: NURSE PRACTITIONER

## 2023-08-24 PROCEDURE — 80061 LIPID PANEL: CPT | Performed by: NURSE PRACTITIONER

## 2023-08-24 PROCEDURE — 93000 ELECTROCARDIOGRAM COMPLETE: CPT | Performed by: NURSE PRACTITIONER

## 2023-08-24 RX ORDER — LISINOPRIL 5 MG/1
5 TABLET ORAL DAILY
Qty: 90 TABLET | Refills: 0 | Status: SHIPPED | OUTPATIENT
Start: 2023-08-24 | End: 2023-11-06 | Stop reason: DRUGHIGH

## 2023-08-24 ASSESSMENT — PATIENT HEALTH QUESTIONNAIRE - PHQ9
SUM OF ALL RESPONSES TO PHQ QUESTIONS 1-9: 1
SUM OF ALL RESPONSES TO PHQ QUESTIONS 1-9: 1
10. IF YOU CHECKED OFF ANY PROBLEMS, HOW DIFFICULT HAVE THESE PROBLEMS MADE IT FOR YOU TO DO YOUR WORK, TAKE CARE OF THINGS AT HOME, OR GET ALONG WITH OTHER PEOPLE: NOT DIFFICULT AT ALL
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
## 2023-09-08 ENCOUNTER — HOSPITAL ENCOUNTER (OUTPATIENT)
Dept: CARDIOLOGY | Facility: CLINIC | Age: 43
Discharge: HOME | End: 2023-09-08
Attending: NURSE PRACTITIONER
Payer: COMMERCIAL

## 2023-09-08 ENCOUNTER — HOSPITAL ENCOUNTER (OUTPATIENT)
Dept: CT IMAGING | Facility: CLINIC | Age: 43
Discharge: HOME | End: 2023-09-08
Attending: NURSE PRACTITIONER
Payer: COMMERCIAL

## 2023-09-08 DIAGNOSIS — R06.00 DYSPNEA, UNSPECIFIED TYPE: ICD-10-CM

## 2023-09-08 DIAGNOSIS — I10 PRIMARY HYPERTENSION: ICD-10-CM

## 2023-09-08 DIAGNOSIS — Z82.49 FAMILY HISTORY OF ISCHEMIC HEART DISEASE: ICD-10-CM

## 2023-09-08 LAB
CV CALCIUM SCORE AGATSTON LM: 0
CV CALCIUM SCORING AGATSON LAD: 0
CV CALCIUM SCORING AGATSTON CX: 0
CV CALCIUM SCORING AGATSTON RCA: 0
CV CALCIUM SCORING AGATSTON TOTAL: 0

## 2023-09-08 PROCEDURE — 93321 DOPPLER ECHO F-UP/LMTD STD: CPT | Mod: 26 | Performed by: INTERNAL MEDICINE

## 2023-09-08 PROCEDURE — 93350 STRESS TTE ONLY: CPT | Mod: 26 | Performed by: INTERNAL MEDICINE

## 2023-09-08 PROCEDURE — 75571 CT HRT W/O DYE W/CA TEST: CPT | Mod: 26 | Performed by: INTERNAL MEDICINE

## 2023-09-08 PROCEDURE — 93016 CV STRESS TEST SUPVJ ONLY: CPT | Performed by: INTERNAL MEDICINE

## 2023-09-08 PROCEDURE — 75571 CT HRT W/O DYE W/CA TEST: CPT

## 2023-09-08 PROCEDURE — 93018 CV STRESS TEST I&R ONLY: CPT | Performed by: INTERNAL MEDICINE

## 2023-09-08 PROCEDURE — 93325 DOPPLER ECHO COLOR FLOW MAPG: CPT | Mod: 26 | Performed by: INTERNAL MEDICINE

## 2023-09-08 PROCEDURE — 93325 DOPPLER ECHO COLOR FLOW MAPG: CPT | Mod: TC

## 2023-09-22 ENCOUNTER — OFFICE VISIT (OUTPATIENT)
Dept: FAMILY MEDICINE | Facility: CLINIC | Age: 43
End: 2023-09-22
Attending: NURSE PRACTITIONER
Payer: COMMERCIAL

## 2023-09-22 VITALS
HEIGHT: 65 IN | SYSTOLIC BLOOD PRESSURE: 134 MMHG | OXYGEN SATURATION: 99 % | HEART RATE: 64 BPM | BODY MASS INDEX: 25.89 KG/M2 | DIASTOLIC BLOOD PRESSURE: 88 MMHG | TEMPERATURE: 98.1 F | WEIGHT: 155.4 LBS

## 2023-09-22 DIAGNOSIS — R06.02 SHORTNESS OF BREATH: Primary | ICD-10-CM

## 2023-09-22 LAB
ALBUMIN SERPL BCG-MCNC: 4.7 G/DL (ref 3.5–5.2)
ALP SERPL-CCNC: 52 U/L (ref 35–104)
ALT SERPL W P-5'-P-CCNC: 18 U/L (ref 0–50)
AST SERPL W P-5'-P-CCNC: 33 U/L (ref 0–45)
BILIRUB DIRECT SERPL-MCNC: <0.2 MG/DL (ref 0–0.3)
BILIRUB SERPL-MCNC: 0.3 MG/DL
ESTRADIOL SERPL-MCNC: 16 PG/ML
FERRITIN SERPL-MCNC: 15 NG/ML (ref 6–175)
FOLATE SERPL-MCNC: 25.3 NG/ML (ref 4.6–34.8)
FSH SERPL IRP2-ACNC: 41.5 MIU/ML
HBA1C MFR BLD: 5.3 % (ref 0–5.6)
PROT SERPL-MCNC: 7.4 G/DL (ref 6.4–8.3)
T3 SERPL-MCNC: 94 NG/DL (ref 85–202)
T3FREE SERPL-MCNC: 2.8 PG/ML (ref 2–4.4)
T4 FREE SERPL-MCNC: 0.93 NG/DL (ref 0.9–1.7)
TSH SERPL DL<=0.005 MIU/L-ACNC: 2.02 UIU/ML (ref 0.3–4.2)
VIT B12 SERPL-MCNC: 444 PG/ML (ref 232–1245)

## 2023-09-22 PROCEDURE — 84481 FREE ASSAY (FT-3): CPT | Performed by: NURSE PRACTITIONER

## 2023-09-22 PROCEDURE — 82306 VITAMIN D 25 HYDROXY: CPT | Performed by: NURSE PRACTITIONER

## 2023-09-22 PROCEDURE — 84439 ASSAY OF FREE THYROXINE: CPT | Performed by: NURSE PRACTITIONER

## 2023-09-22 PROCEDURE — 80076 HEPATIC FUNCTION PANEL: CPT | Performed by: NURSE PRACTITIONER

## 2023-09-22 PROCEDURE — 84482 T3 REVERSE: CPT | Mod: 90 | Performed by: NURSE PRACTITIONER

## 2023-09-22 PROCEDURE — 83036 HEMOGLOBIN GLYCOSYLATED A1C: CPT | Performed by: NURSE PRACTITIONER

## 2023-09-22 PROCEDURE — 84403 ASSAY OF TOTAL TESTOSTERONE: CPT | Performed by: NURSE PRACTITIONER

## 2023-09-22 PROCEDURE — 82728 ASSAY OF FERRITIN: CPT | Performed by: NURSE PRACTITIONER

## 2023-09-22 PROCEDURE — 99000 SPECIMEN HANDLING OFFICE-LAB: CPT | Performed by: NURSE PRACTITIONER

## 2023-09-22 PROCEDURE — 84443 ASSAY THYROID STIM HORMONE: CPT | Performed by: NURSE PRACTITIONER

## 2023-09-22 PROCEDURE — 36415 COLL VENOUS BLD VENIPUNCTURE: CPT | Performed by: NURSE PRACTITIONER

## 2023-09-22 PROCEDURE — 99214 OFFICE O/P EST MOD 30 MIN: CPT | Performed by: NURSE PRACTITIONER

## 2023-09-22 PROCEDURE — 82670 ASSAY OF TOTAL ESTRADIOL: CPT | Performed by: NURSE PRACTITIONER

## 2023-09-22 PROCEDURE — 83001 ASSAY OF GONADOTROPIN (FSH): CPT | Performed by: NURSE PRACTITIONER

## 2023-09-22 PROCEDURE — 82607 VITAMIN B-12: CPT | Performed by: NURSE PRACTITIONER

## 2023-09-22 PROCEDURE — 82746 ASSAY OF FOLIC ACID SERUM: CPT | Performed by: NURSE PRACTITIONER

## 2023-09-22 RX ORDER — LISINOPRIL 10 MG/1
10 TABLET ORAL DAILY
Qty: 90 TABLET | Refills: 3 | Status: SHIPPED | OUTPATIENT
Start: 2023-09-22 | End: 2024-02-21

## 2023-09-22 NOTE — PROGRESS NOTES
"  Assessment & Plan     (R06.02) Shortness of breath  (primary encounter diagnosis)  Comment:   Plan: Estradiol, Hepatic function panel, Vitamin D         Deficiency, Follicle stimulating hormone,         Testosterone, total, TSH with free T4 reflex,         T4, free, T3, total, T3 reverse, T3 Free,         Vitamin B12, Folate, Hemoglobin A1c, Ferritin,         Thyroid peroxidase antibody, lisinopril         (ZESTRIL) 10 MG tablet, TSH          7 months of symptoms shortness of breath happens maybe a couple times a week and lasts for a minute or 2, feels like she has to breathe deep and cannot quite get her breath.  Does not feel like this is a problem in her lungs and declines chest x-ray.  She feels some peace of mind knowing that her cardiac work-up was negative despite strong family history of heart disease.  Her blood pressure is a bit improved but is agreeable to increasing the lisinopril to 10 mg daily.  She practices a very healthy lifestyle.  She is on a number of vitamin supplements but she feels this is either hormonal or nutrient deficiency of some sort and comes with a list of lab tests that she would like run today.             BMI:   Estimated body mass index is 25.66 kg/m  as calculated from the following:    Height as of this encounter: 1.657 m (5' 5.25\").    Weight as of this encounter: 70.5 kg (155 lb 6.4 oz).           Joana Winston NP  [facility]    Angy Magana is a 43 year old, presenting for the following health issues: following up from last month on SOB and family hx of heart disease  As above  Follow Up and Shortness of Breath        9/22/2023    12:56 PM   Additional Questions   Roomed by tierra dubose   Accompanied by self         History of Present Illness       Hyperlipidemia:  She presents for follow up of hyperlipidemia.   She is not taking medication to lower cholesterol. She is not having myalgia or other side effects to statin " "medications.    Hypertension: She presents for follow up of hypertension.  She does not check blood pressure  regularly outside of the clinic. Outside blood pressures have been over 140/90. She does not follow a low salt diet.     She eats 4 or more servings of fruits and vegetables daily.She consumes 0 sweetened beverage(s) daily.She exercises with enough effort to increase her heart rate 30 to 60 minutes per day.  She exercises with enough effort to increase her heart rate 7 days per week.   She is taking medications regularly.             Review of Systems         Objective    /88 (BP Location: Right arm, Patient Position: Sitting)   Pulse 64   Temp 98.1  F (36.7  C)   Ht 1.657 m (5' 5.25\")   Wt 70.5 kg (155 lb 6.4 oz)   LMP 08/17/2023   SpO2 99%   BMI 25.66 kg/m    Body mass index is 25.66 kg/m .  Physical Exam     Alert and oriented no acute distress  Skin warm pink dry no rash  Heart regular lungs clear with good air movement bilaterally                        "

## 2023-09-25 LAB — DEPRECATED CALCIDIOL+CALCIFEROL SERPL-MC: 51 UG/L (ref 20–75)

## 2023-09-26 LAB
T3REVERSE SERPL-MCNC: 10.3 NG/DL
TESTOST SERPL-MCNC: 22 NG/DL (ref 8–60)

## 2023-10-26 ENCOUNTER — MYC MEDICAL ADVICE (OUTPATIENT)
Dept: FAMILY MEDICINE | Facility: CLINIC | Age: 43
End: 2023-10-26
Payer: COMMERCIAL

## 2023-10-26 DIAGNOSIS — N95.1 MENOPAUSAL SYNDROME (HOT FLASHES): Primary | ICD-10-CM

## 2023-10-26 NOTE — TELEPHONE ENCOUNTER
Please check with lab and see why this wasn't run, TPO antibodies were ordered the same day I believe as all the other thyroid tests. Thanks.

## 2023-10-27 NOTE — TELEPHONE ENCOUNTER
Spoke with lab and it appears the TPO test was missed. Unfortunately, it has been too long to run off previous blood sample. Patient will need to return to clinic for a lab visit if this is still needed.

## 2023-11-02 ENCOUNTER — TRANSFERRED RECORDS (OUTPATIENT)
Dept: HEALTH INFORMATION MANAGEMENT | Facility: CLINIC | Age: 43
End: 2023-11-02
Payer: COMMERCIAL

## 2023-11-06 ENCOUNTER — OFFICE VISIT (OUTPATIENT)
Dept: FAMILY MEDICINE | Facility: CLINIC | Age: 43
End: 2023-11-06
Payer: COMMERCIAL

## 2023-11-06 VITALS
WEIGHT: 156.8 LBS | RESPIRATION RATE: 16 BRPM | HEIGHT: 65 IN | BODY MASS INDEX: 26.12 KG/M2 | OXYGEN SATURATION: 98 % | HEART RATE: 79 BPM | DIASTOLIC BLOOD PRESSURE: 92 MMHG | TEMPERATURE: 97.6 F | SYSTOLIC BLOOD PRESSURE: 146 MMHG

## 2023-11-06 DIAGNOSIS — R53.83 OTHER FATIGUE: ICD-10-CM

## 2023-11-06 DIAGNOSIS — N95.1 MENOPAUSAL SYNDROME (HOT FLASHES): Primary | ICD-10-CM

## 2023-11-06 PROCEDURE — 99214 OFFICE O/P EST MOD 30 MIN: CPT | Performed by: FAMILY MEDICINE

## 2023-11-06 NOTE — PROGRESS NOTES
"Clinical Decision Making:    At the end of the encounter, I discussed results, diagnosis, medications. Discussed red flags for immediate return to clinic/ER, as well as indications for follow up if no improvement. Patient understood and agreed to plan. Patient was stable for discharge.      ICD-10-CM    1. Menopausal syndrome (hot flashes)  N95.1 Cortisol     Anti thyroglobulin antibody      2. Other fatigue  R53.83 Cortisol     Anti thyroglobulin antibody        We will test morning cortisol  Added antithyroglobulin antibody to the antithyroperoxidase order  Discussed with patient how stress, sleep and diet all interact with hormones.  Reviewed her labs done in August and September with the patient.  Printed out the [place] for functional medicine elimination diet and encouraged her to try this for 3 weeks in January or February when she can find the time.  Patient has a follow-up appointment with her naturopath in 2 weeks.      There are no Patient Instructions on file for this visit.   No follow-ups on file.      chief complaint    HPI:  Izzy Gomez is a 43 year old female who presents today complaining that it feels like her hormones are \"out of whack\".  She has weight gain despite exercise.  She has terrible hot flashes.  She was especially noting this last year and early in 2023 she saw a naturopathic doctor.  She has been on supplements including an herbal for stress management, some thing for immune and digestive support, multivitamin with trace elements and whole food enzymes.  1 that supports microbiome and detox and 1 that supports microbiome and immune health.  She did initially notice good results and some resolution of her hot flashes with this regimen.  For the last few weeks she has seen a return of her night sweats.  She has a recheck with her naturopathic doctor in 2 weeks.  She is currently getting her periods sometimes every month and sometimes every 6 to 8 weeks.  It is a light bleed for 2 " days.  Her mom went into menopause around age 50.    She eats 3 meals a day.  For breakfast she has eggs and toast.  She has a salad for lunch and for dinner has vegetables, starch and meat protein.  Current stress in her life includes sponsoring a family from [facility] that is living with them.  Her job has been up and down since the pandemic as well.  She is up a few times every night either using the bathroom or the dog wakes her up.  Sometimes she can get back to sleep and sometimes not.  She does not feel rested in the morning.  She works out 6 days a week including [employer], [employer], bike and walking the dog  Supportive relationships include her , daughter and parents.    Chart review shows earlier this year complains of shortness of breath for 7 months and a strong family history of heart disease.  She had a cardiac work-up which included an EKG, CT calcium score and stress echo which were all normal.  She was started on lisinopril 10 mg daily.  In August a CBC showed a low white count with low hemoglobin and a low MCV.  Elevated lipids and normal BMP.  September labs showed her estradiol and FSH levels which were normal for menopause.  Testosterone was within normal limits.  Hepatic panel normal.  TSH and vitamin D level normal.  Thyroid indices all normal and ratios including free T3 to free T4 at three-point no which is normal and total T3 to reverse T3 at 9 which is normal.  Ferritin was low at 15.  Hemoglobin A1c normal at 5.3 and B12 and folate within normal limits.    History obtained from patient and chart review    Problem List:  2023-08: Family history of ischemic heart disease  2023-08: Primary hypertension  2023-01: Menopausal syndrome (hot flashes)  2023-01: Dyslipidemia  2023-01: Gastroesophageal reflux disease  2023-01: Hyperhidrosis  2023-01: Mild episode of recurrent major depressive disorder (H24)      Past Medical History:   Diagnosis Date    Depressive disorder     Primary  "hypertension 2023       Social History     Tobacco Use    Smoking status: Former     Types: Cigarettes     Quit date:      Years since quittin.8    Smokeless tobacco: Never   Substance Use Topics    Alcohol use: Not on file       Review of systems  Medical symptom questionnaire shows excessive sweating and fatigue as more common symptoms for her and then she had multiple occasional symptoms including headaches, insomnia, canker sores, irregular heartbeat, shortness of breath, heartburn, excessive weight, poor memory, mood swings.    Vitals:    23 1534   BP: (!) 146/92   BP Location: Right arm   Patient Position: Sitting   Cuff Size: Adult Regular   Pulse: 79   Resp: 16   Temp: 97.6  F (36.4  C)   SpO2: 98%   Weight: 71.1 kg (156 lb 12.8 oz)   Height: 1.657 m (5' 5.25\")       Physical Exam  Vitals within normal limits except for elevated blood pressure today  In general she is alert, oriented, and in no acute distress  Breathing is not labored  Medical symptom questionnaire score of 31    Total time spent on encounter today including chart review, time with patient and charting was 39 minutes.  "

## 2023-11-07 ENCOUNTER — OFFICE VISIT (OUTPATIENT)
Dept: FAMILY MEDICINE | Facility: CLINIC | Age: 43
End: 2023-11-07
Payer: COMMERCIAL

## 2023-11-07 ENCOUNTER — MYC MEDICAL ADVICE (OUTPATIENT)
Dept: FAMILY MEDICINE | Facility: CLINIC | Age: 43
End: 2023-11-07

## 2023-11-07 VITALS
HEIGHT: 65 IN | BODY MASS INDEX: 26.12 KG/M2 | WEIGHT: 156.75 LBS | TEMPERATURE: 98.2 F | HEART RATE: 61 BPM | DIASTOLIC BLOOD PRESSURE: 76 MMHG | SYSTOLIC BLOOD PRESSURE: 126 MMHG | OXYGEN SATURATION: 98 %

## 2023-11-07 DIAGNOSIS — R22.32 FINGER MASS, LEFT: ICD-10-CM

## 2023-11-07 DIAGNOSIS — N95.1 MENOPAUSAL SYNDROME (HOT FLASHES): ICD-10-CM

## 2023-11-07 DIAGNOSIS — Z83.49 FAMILY HISTORY OF PSEUDOCHOLINESTERASE DEFICIENCY: ICD-10-CM

## 2023-11-07 DIAGNOSIS — Z01.818 PREOP GENERAL PHYSICAL EXAM: Primary | ICD-10-CM

## 2023-11-07 DIAGNOSIS — E88.09 PSEUDOCHOLINESTERASE DEFICIENCY: ICD-10-CM

## 2023-11-07 DIAGNOSIS — R06.02 SHORTNESS OF BREATH: ICD-10-CM

## 2023-11-07 LAB
ERYTHROCYTE [DISTWIDTH] IN BLOOD BY AUTOMATED COUNT: 16.5 % (ref 10–15)
HCT VFR BLD AUTO: 38.3 % (ref 35–47)
HGB BLD-MCNC: 11.9 G/DL (ref 11.7–15.7)
MCH RBC QN AUTO: 26 PG (ref 26.5–33)
MCHC RBC AUTO-ENTMCNC: 31.1 G/DL (ref 31.5–36.5)
MCV RBC AUTO: 84 FL (ref 78–100)
PLATELET # BLD AUTO: 172 10E3/UL (ref 150–450)
RBC # BLD AUTO: 4.58 10E6/UL (ref 3.8–5.2)
THYROPEROXIDASE AB SERPL-ACNC: <10 IU/ML
WBC # BLD AUTO: 3.8 10E3/UL (ref 4–11)

## 2023-11-07 PROCEDURE — 85027 COMPLETE CBC AUTOMATED: CPT | Performed by: NURSE PRACTITIONER

## 2023-11-07 PROCEDURE — 86376 MICROSOMAL ANTIBODY EACH: CPT | Performed by: NURSE PRACTITIONER

## 2023-11-07 PROCEDURE — 36415 COLL VENOUS BLD VENIPUNCTURE: CPT | Performed by: NURSE PRACTITIONER

## 2023-11-07 PROCEDURE — 99214 OFFICE O/P EST MOD 30 MIN: CPT | Performed by: NURSE PRACTITIONER

## 2023-11-07 PROCEDURE — 82480 ASSAY SERUM CHOLINESTERASE: CPT | Mod: 90 | Performed by: NURSE PRACTITIONER

## 2023-11-07 PROCEDURE — 99000 SPECIMEN HANDLING OFFICE-LAB: CPT | Performed by: NURSE PRACTITIONER

## 2023-11-07 NOTE — TELEPHONE ENCOUNTER
Routing to PCP for review. Pt was seen today 11/7/23 for Preop, she is wondering why she did not have a pregnancy test. Please advise.

## 2023-11-07 NOTE — PROGRESS NOTES
[address] [address]  Phone: [phone]  Fax: [phone]  Primary Provider: Joana Winston  Pre-op Performing Provider: JOANA WINSTON      PREOPERATIVE EVALUATION:  Today's date: 11/7/2023    Izzy is a 43 year old female who presents for a preoperative evaluation.      11/7/2023     9:55 AM   Additional Questions   Roomed by tierra dubose   Accompanied by self       Surgical Information:  Surgery/Procedure: left IF mass excision  Surgery Location: [facility]  Surgeon: Dr Magallanes  Surgery Date: 11/10/2023  Time of Surgery: TBD  Where patient plans to recover: At home with family  Fax number for surgical facility: [phone]    Assessment & Plan     The proposed surgical procedure is considered INTERMEDIATE risk.    Preop general physical exam    - CBC with platelets; Future  - Pseudocholinesterase; Future    Finger mass, left    - CBC with platelets; Future  - Pseudocholinesterase; Future    Family history of pseudocholinesterase deficiency  Unclear if Izzy has had this tested in the past  She is having a low risk surgery with local block and likely mild sedation but she is to communicate this family history to the anesthesiologist at surgery.  I did reach out to her surgeon's team to give them this information.            - No identified additional risk factors other than previously addressed as above with regards to family history of pseudocholinesterase deficiency    Antiplatelet or Anticoagulation Medication Instructions:   - Patient is on no antiplatelet or anticoagulation medications.    Additional Medication Instructions:  Patient is to take all scheduled medications on the day of surgery    RECOMMENDATION:  APPROVAL GIVEN to proceed with proposed procedure, without further diagnostic evaluation.            Subjective       HPI related to upcoming procedure: History of a bump on her finger, has had it surgically addressed in the  past but it has grown back.  They are going to remove it again with mild sedation and a local block is her understanding.        11/7/2023     9:51 AM   Preop Questions   1. Have you ever had a heart attack or stroke? No   2. Have you ever had surgery on your heart or blood vessels, such as a stent placement, a coronary artery bypass, or surgery on an artery in your head, neck, heart, or legs? No   3. Do you have chest pain with activity? No   4. Do you have a history of  heart failure? No   5. Do you currently have a cold, bronchitis or symptoms of other infection? No   6. Do you have a cough, shortness of breath, or wheezing? No   7. Do you or anyone in your family have previous history of blood clots? No   8. Do you or does anyone in your family have a serious bleeding problem such as prolonged bleeding following surgeries or cuts? No   9. Have you ever had problems with anemia or been told to take iron pills? YES - hx of this   10. Have you had any abnormal blood loss such as black, tarry or bloody stools, or abnormal vaginal bleeding? No   11. Have you ever had a blood transfusion? No   12. Are you willing to have a blood transfusion if it is medically needed before, during, or after your surgery? Yes   13. Have you or any of your relatives ever had problems with anesthesia? YES - pseudocholinesterace def   14. Do you have sleep apnea, excessive snoring or daytime drowsiness? No   15. Do you have any artifical heart valves or other implanted medical devices like a pacemaker, defibrillator, or continuous glucose monitor? No   16. Do you have artificial joints? No   17. Are you allergic to latex? No   18. Is there any chance that you may be pregnant? No       Health Care Directive:  Patient does not have a Health Care Directive or Living Will: Patient states has Advance Directive and will bring in a copy to clinic.    Preoperative Review of :   reviewed - no record of controlled substances  prescribed.          Review of Systems  CONSTITUTIONAL: NEGATIVE for fever, chills, change in weight  INTEGUMENTARY/SKIN: NEGATIVE for worrisome rashes, moles or lesions  EYES: NEGATIVE for vision changes or irritation  ENT/MOUTH: NEGATIVE for ear, mouth and throat problems  RESP: NEGATIVE for significant cough or SOB  CV: NEGATIVE for chest pain, palpitations or peripheral edema  GI: NEGATIVE for nausea, abdominal pain, heartburn, or change in bowel habits  : NEGATIVE for frequency, dysuria, or hematuria  NEURO: NEGATIVE for weakness, dizziness or paresthesias  ENDOCRINE: NEGATIVE for temperature intolerance, skin/hair changes  HEME: NEGATIVE for bleeding problems  PSYCHIATRIC: NEGATIVE for changes in mood or affect    Patient Active Problem List    Diagnosis Date Noted    Family history of ischemic heart disease 08/24/2023     Priority: Medium    Primary hypertension 08/24/2023     Priority: Medium    Menopausal syndrome (hot flashes) 01/31/2023     Priority: Medium    Dyslipidemia 01/30/2023     Priority: Medium    Gastroesophageal reflux disease 01/30/2023     Priority: Medium    Hyperhidrosis 01/30/2023     Priority: Medium    Mild episode of recurrent major depressive disorder (H24) 01/30/2023     Priority: Medium      Past Medical History:   Diagnosis Date    Depressive disorder     Primary hypertension 8/24/2023     Past Surgical History:   Procedure Laterality Date    ABDOMEN SURGERY N/A 2004    tummy tuck and lipo suciton    EYE SURGERY Bilateral 2003    lasix    FINGER SURGERY Left 02/2022    left index mass removal    ORTHOPEDIC SURGERY Left     knee menisectomy     Current Outpatient Medications   Medication Sig Dispense Refill    lisinopril (ZESTRIL) 10 MG tablet Take 1 tablet (10 mg) by mouth daily 90 tablet 3    sertraline (ZOLOFT) 100 MG tablet Take 1 tablet (100 mg) by mouth daily 90 tablet 3       Allergies   Allergen Reactions    Oxycodone-Acetaminophen Itching        Social History     Tobacco  "Use    Smoking status: Former     Types: Cigarettes     Quit date:      Years since quittin.8    Smokeless tobacco: Never   Substance Use Topics    Alcohol use: Not on file     2 aunts with pseudocholinesterase deficiency and her mother.  History   Drug Use Not on file         Objective     /76 (BP Location: Right arm, Patient Position: Sitting)   Pulse 61   Temp 98.2  F (36.8  C)   Ht 1.657 m (5' 5.25\")   Wt 71.1 kg (156 lb 12 oz)   LMP 10/17/2023 (Approximate)   SpO2 98%   Breastfeeding No   BMI 25.88 kg/m      Physical Exam    GENERAL APPEARANCE: healthy, alert and no distress     EYES: EOMI, PERRL     HENT: ear canals and TM's normal and nose and mouth without ulcers or lesions     NECK: no adenopathy, no asymmetry, masses, or scars and thyroid normal to palpation     RESP: lungs clear to auscultation - no rales, rhonchi or wheezes     CV: regular rates and rhythm, normal S1 S2, no S3 or S4 and no murmur, click or rub     ABDOMEN:  soft, nontender, no HSM or masses and bowel sounds normal     MS: extremities normal- no gross deformities noted, no evidence of inflammation in joints, FROM in all extremities.     SKIN: no suspicious lesions or rashes     NEURO: Normal strength and tone, sensory exam grossly normal, mentation intact and speech normal     PSYCH: mentation appears normal. and affect normal/bright     LYMPHATICS: No cervical adenopathy    Recent Labs   Lab Test 23  1327 23  0744 23  0924   HGB  --  11.0*  --    PLT  --  191  --    NA  --  139 141   POTASSIUM  --  4.3 4.5   CR  --  0.69 0.68   A1C 5.3  --   --         Diagnostics:  Recent Results (from the past 24 hour(s))   CBC with platelets    Collection Time: 23 10:47 AM   Result Value Ref Range    WBC Count 3.8 (L) 4.0 - 11.0 10e3/uL    RBC Count 4.58 3.80 - 5.20 10e6/uL    Hemoglobin 11.9 11.7 - 15.7 g/dL    Hematocrit 38.3 35.0 - 47.0 %    MCV 84 78 - 100 fL    MCH 26.0 (L) 26.5 - 33.0 pg    MCHC " 31.1 (L) 31.5 - 36.5 g/dL    RDW 16.5 (H) 10.0 - 15.0 %    Platelet Count 172 150 - 450 10e3/uL      No EKG required, no history of coronary heart disease, significant arrhythmia, peripheral arterial disease or other structural heart disease.    Revised Cardiac Risk Index (RCRI):  The patient has the following serious cardiovascular risks for perioperative complications:   - No serious cardiac risks = 0 points     RCRI Interpretation: 0 points: Class I (very low risk - 0.4% complication rate)         Signed Electronically by: Joana Winston NP  Copy of this evaluation report is provided to requesting physician.

## 2023-11-09 LAB — CHOLINESTERASE SERPL-CCNC: 2724 U/L

## 2023-11-10 ENCOUNTER — LAB REQUISITION (OUTPATIENT)
Dept: LAB | Facility: CLINIC | Age: 43
End: 2023-11-10
Payer: COMMERCIAL

## 2023-11-10 ENCOUNTER — TRANSFERRED RECORDS (OUTPATIENT)
Dept: HEALTH INFORMATION MANAGEMENT | Facility: CLINIC | Age: 43
End: 2023-11-10
Payer: COMMERCIAL

## 2023-11-10 DIAGNOSIS — R22.32 LOCALIZED SWELLING, MASS AND LUMP, LEFT UPPER LIMB: ICD-10-CM

## 2023-11-10 PROBLEM — E88.09 PSEUDOCHOLINESTERASE DEFICIENCY: Status: ACTIVE | Noted: 2023-11-10

## 2023-11-10 PROCEDURE — 88305 TISSUE EXAM BY PATHOLOGIST: CPT | Mod: 26 | Performed by: PATHOLOGY

## 2023-11-10 PROCEDURE — 88305 TISSUE EXAM BY PATHOLOGIST: CPT | Mod: TC,ORL | Performed by: ORTHOPAEDIC SURGERY

## 2023-11-15 LAB
PATH REPORT.COMMENTS IMP SPEC: NORMAL
PATH REPORT.COMMENTS IMP SPEC: NORMAL
PATH REPORT.FINAL DX SPEC: NORMAL
PATH REPORT.GROSS SPEC: NORMAL
PATH REPORT.MICROSCOPIC SPEC OTHER STN: NORMAL
PATH REPORT.RELEVANT HX SPEC: NORMAL
PHOTO IMAGE: NORMAL

## 2023-11-27 ENCOUNTER — TRANSFERRED RECORDS (OUTPATIENT)
Dept: HEALTH INFORMATION MANAGEMENT | Facility: CLINIC | Age: 43
End: 2023-11-27
Payer: COMMERCIAL

## 2023-12-21 ENCOUNTER — TRANSFERRED RECORDS (OUTPATIENT)
Dept: HEALTH INFORMATION MANAGEMENT | Facility: CLINIC | Age: 43
End: 2023-12-21
Payer: COMMERCIAL

## 2024-02-20 SDOH — HEALTH STABILITY: PHYSICAL HEALTH: ON AVERAGE, HOW MANY DAYS PER WEEK DO YOU ENGAGE IN MODERATE TO STRENUOUS EXERCISE (LIKE A BRISK WALK)?: 6 DAYS

## 2024-02-20 ASSESSMENT — SOCIAL DETERMINANTS OF HEALTH (SDOH): HOW OFTEN DO YOU GET TOGETHER WITH FRIENDS OR RELATIVES?: ONCE A WEEK

## 2024-02-20 ASSESSMENT — PATIENT HEALTH QUESTIONNAIRE - PHQ9
10. IF YOU CHECKED OFF ANY PROBLEMS, HOW DIFFICULT HAVE THESE PROBLEMS MADE IT FOR YOU TO DO YOUR WORK, TAKE CARE OF THINGS AT HOME, OR GET ALONG WITH OTHER PEOPLE: NOT DIFFICULT AT ALL
SUM OF ALL RESPONSES TO PHQ QUESTIONS 1-9: 1
SUM OF ALL RESPONSES TO PHQ QUESTIONS 1-9: 1

## 2024-02-21 ENCOUNTER — OFFICE VISIT (OUTPATIENT)
Dept: FAMILY MEDICINE | Facility: CLINIC | Age: 44
End: 2024-02-21
Payer: COMMERCIAL

## 2024-02-21 VITALS
OXYGEN SATURATION: 97 % | DIASTOLIC BLOOD PRESSURE: 60 MMHG | HEIGHT: 66 IN | RESPIRATION RATE: 16 BRPM | TEMPERATURE: 97.6 F | SYSTOLIC BLOOD PRESSURE: 104 MMHG | WEIGHT: 157 LBS | HEART RATE: 77 BPM | BODY MASS INDEX: 25.23 KG/M2

## 2024-02-21 DIAGNOSIS — F33.0 MILD EPISODE OF RECURRENT MAJOR DEPRESSIVE DISORDER (H): ICD-10-CM

## 2024-02-21 DIAGNOSIS — Z00.00 ROUTINE GENERAL MEDICAL EXAMINATION AT A HEALTH CARE FACILITY: ICD-10-CM

## 2024-02-21 DIAGNOSIS — I10 PRIMARY HYPERTENSION: Primary | ICD-10-CM

## 2024-02-21 PROCEDURE — 99213 OFFICE O/P EST LOW 20 MIN: CPT | Mod: 25 | Performed by: NURSE PRACTITIONER

## 2024-02-21 PROCEDURE — 99396 PREV VISIT EST AGE 40-64: CPT | Performed by: NURSE PRACTITIONER

## 2024-02-21 RX ORDER — SERTRALINE HYDROCHLORIDE 100 MG/1
100 TABLET, FILM COATED ORAL DAILY
Qty: 90 TABLET | Refills: 3 | Status: SHIPPED | OUTPATIENT
Start: 2024-02-21

## 2024-02-21 RX ORDER — LISINOPRIL 10 MG/1
10 TABLET ORAL DAILY
Qty: 90 TABLET | Refills: 3 | Status: SHIPPED | OUTPATIENT
Start: 2024-02-21

## 2024-02-21 NOTE — PROGRESS NOTES
"Preventive Care Visit  [facility]  Joana Winston NP, Family Medicine  Feb 21, 2024    Assessment & Plan     (I10) Primary hypertension  (primary encounter diagnosis)  Comment:   Plan: REVIEW OF HEALTH MAINTENANCE PROTOCOL ORDERS,         Basic metabolic panel        Well controlled with lisinopril        Orders for lab work next fall, meds filled for 1 year         She has FH heart disease but she has worked hard to reduce cholesterol and she is avid with exercise and consuming healthy diet    (F33.0) Mild episode of recurrent major depressive disorder (H24)  Comment: well controlled, no desire to stop medication or reduce dose, low phq9  Plan: sertraline (ZOLOFT) 100 MG tablet            (Z00.00) Routine general medical examination at a health care facility  Comment:   Plan:     She will call me at age 44 for colon cancer screen with Cologuard (she is low risk)  Declines vaccines today              BMI  Estimated body mass index is 25.73 kg/m  as calculated from the following:    Height as of this encounter: 1.664 m (5' 5.5\").    Weight as of this encounter: 71.2 kg (157 lb).       Counseling  Appropriate preventive services were discussed with this patient, including applicable screening as appropriate for fall prevention, nutrition, physical activity, Tobacco-use cessation, weight loss and cognition.  Checklist reviewing preventive services available has been given to the patient.  Reviewed patient's diet, addressing concerns and/or questions.           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  pERIODS MONTHLY OR MISSES A FEW.  He has vasectomy    No colon cancer in family  Some cancer in mom's side of family  She will call me age 44 for cologuard screening      Physical        2/21/2024     6:54 AM   Additional Questions   Roomed by [facility] " Directive  Patient does not have a Health Care Directive or Living Will: Patient states has Advance Directive and will bring in a copy to clinic.    HPI              2024   General Health   How would you rate your overall physical health? Good   Feel stress (tense, anxious, or unable to sleep) Not at all         2024   Nutrition   Three or more servings of calcium each day? Yes   Diet: Regular (no restrictions)   How many servings of fruit and vegetables per day? 4 or more   How many sweetened beverages each day? 0-1         2024   Exercise   Days per week of moderate/strenous exercise 6 days         2024   Social Factors   Frequency of gathering with friends or relatives Once a week   Worry food won't last until get money to buy more No   Food not last or not have enough money for food? No   Do you have housing?  Yes   Are you worried about losing your housing? No   Lack of transportation? No   Unable to get utilities (heat,electricity)? No         2024   Dental   Dentist two times every year? Yes         2024   TB Screening   Were you born outside of [place]?  No       Today's PHQ-9 Score:       2024    10:21 AM   PHQ-9 SCORE   PHQ-9 Total Score MyChart 1 (Minimal depression)   PHQ-9 Total Score 1         2024   Substance Use   Alcohol more than 3/day or more than 7/wk No   Do you use any other substances recreationally? (!) ALCOHOL     Social History     Tobacco Use    Smoking status: Former     Types: Cigarettes     Quit date: 2010     Years since quittin.1     Passive exposure: Past    Smokeless tobacco: Never   Vaping Use    Vaping Use: Never used   Substance Use Topics    Alcohol use: Yes    Drug use: Never           2023   LAST FHS-7 RESULTS   1st degree relative breast or ovarian cancer No   Any relative bilateral breast cancer No   Any male have breast cancer No   Any woman have breast and ovarian cancer Yes   Any woman with breast cancer before 50yrs No   2  "or more relatives with breast and/or ovarian cancer No   2 or more relatives with breast and/or bowel cancer No        Mammogram Screening - Mammogram every 1-2 years updated in Health Maintenance based on mutual decision making        2/20/2024   STI Screening   New sexual partner(s) since last STI/HIV test? No     History of abnormal Pap smear: NO - age 30-65 PAP every 5 years with negative HPV co-testing recommended       The 10-year ASCVD risk score (Santos GARZA, et al., 2019) is: 0.9%    Values used to calculate the score:      Age: 43 years      Sex: Female      Is Non- : No      Diabetic: No      Tobacco smoker: No      Systolic Blood Pressure: 126 mmHg      Is BP treated: Yes      HDL Cholesterol: 74 mg/dL      Total Cholesterol: 249 mg/dL        2/20/2024   Contraception/Family Planning   Questions about contraception or family planning No        Reviewed and updated as needed this visit by Provider                             Objective    Exam  LMP  (LMP Unknown)    Estimated body mass index is 25.88 kg/m  as calculated from the following:    Height as of 11/7/23: 1.657 m (5' 5.25\").    Weight as of 11/7/23: 71.1 kg (156 lb 12 oz).    Physical Exam  GENERAL: alert and no distress  EYES: Eyes grossly normal to inspection, PERRL and conjunctivae and sclerae normal  HENT: ear canals and TM's normal, nose and mouth without ulcers or lesions  NECK: no adenopathy, no asymmetry, masses, or scars  RESP: lungs clear to auscultation - no rales, rhonchi or wheezes  CV: regular rate and rhythm, normal S1 S2, no S3 or S4, no murmur, click or rub, no peripheral edema  ABDOMEN: soft, nontender, no hepatosplenomegaly, no masses and bowel sounds normal  MS: no gross musculoskeletal defects noted, no edema  SKIN: no suspicious lesions or rashes  NEURO: Normal strength and tone, mentation intact and speech normal  PSYCH: mentation appears normal, affect normal/bright      Signed Electronically by: Joana " BUNNY Winston    Answers submitted by the patient for this visit:  Patient Health Questionnaire (Submitted on 2/20/2024)  If you checked off any problems, how difficult have these problems made it for you to do your work, take care of things at home, or get along with other people?: Not difficult at all  PHQ9 TOTAL SCORE: 1

## 2024-02-21 NOTE — PATIENT INSTRUCTIONS
Preventive Care Advice   This is general advice given by our system to help you stay healthy. However, your care team may have specific advice just for you. Please talk to your care team about your preventive care needs.  Nutrition  Eat 5 or more servings of fruits and vegetables each day.  Try wheat bread, brown rice and whole grain pasta (instead of white bread, rice, and pasta).  Get enough calcium and vitamin D. Check the label on foods and aim for 100% of the RDA (recommended daily allowance).  Lifestyle  Exercise at least 150 minutes each week  (30 minutes a day, 5 days a week).  Do muscle strengthening activities 2 days a week. These help control your weight and prevent disease.  No smoking.  Wear sunscreen to prevent skin cancer.  Have a dental exam and cleaning every 6 months.  Yearly exams  See your health care team every year to talk about:  Any changes in your health.  Any medicines your care team has prescribed.  Preventive care, family planning, and ways to prevent chronic diseases.  Shots (vaccines)   HPV shots (up to age 26), if you've never had them before.  Hepatitis B shots (up to age 59), if you've never had them before.  COVID-19 shot: Get this shot when it's due.  Flu shot: Get a flu shot every year.  Tetanus shot: Get a tetanus shot every 10 years.  Pneumococcal, hepatitis A, and RSV shots: Ask your care team if you need these based on your risk.  Shingles shot (for age 50 and up)  General health tests  Diabetes screening:  Starting at age 35, Get screened for diabetes at least every 3 years.  If you are younger than age 35, ask your care team if you should be screened for diabetes.  Cholesterol test: At age 39, start having a cholesterol test every 5 years, or more often if advised.  Bone density scan (DEXA): At age 50, ask your care team if you should have this scan for osteoporosis (brittle bones).  Hepatitis C: Get tested at least once in your life.  STIs (sexually transmitted  infections)  Before age 24: Ask your care team if you should be screened for STIs.  After age 24: Get screened for STIs if you're at risk. You are at risk for STIs (including HIV) if:  You are sexually active with more than one person.  You don't use condoms every time.  You or a partner was diagnosed with a sexually transmitted infection.  If you are at risk for HIV, ask about PrEP medicine to prevent HIV.  Get tested for HIV at least once in your life, whether you are at risk for HIV or not.  Cancer screening tests  Cervical cancer screening: If you have a cervix, begin getting regular cervical cancer screening tests starting at age 21.  Breast cancer scan (mammogram): If you've ever had breasts, begin having regular mammograms starting at age 40. This is a scan to check for breast cancer.  Colon cancer screening: It is important to start screening for colon cancer at age 45.  Have a colonoscopy test every 10 years (or more often if you're at risk) Or, ask your provider about stool tests like a FIT test every year or Cologuard test every 3 years.  To learn more about your testing options, visit:   https://[email or web address]/605668.pdf.  For help making a decision, visit:   https://bit.ly/tn28871.  Prostate cancer screening test: If you have a prostate, ask your care team if a prostate cancer screening test (PSA) at age 55 is right for you.  Lung cancer screening: If you are a current or former smoker ages 50 to 80, ask your care team if ongoing lung cancer screenings are right for you.  For informational purposes only. Not to replace the advice of your health care provider. Copyright   2023 [facility]. All rights reserved. Clinically reviewed by the [facility] Transitions Program. [employer] 232463 - REV 01/24.    Substance Use Disorder: Care Instructions  Overview     You can improve your life and health by stopping your use of alcohol or drugs. When you don't drink or use drugs, you may feel  and sleep better. You may get along better with your family, friends, and coworkers. There are medicines and programs that can help with substance use disorder.  How can you care for yourself at home?  Here are some ways to help you stay sober and prevent relapse.  If you have been given medicine to help keep you sober or reduce your cravings, be sure to take it exactly as prescribed.  Talk to your doctor about programs that can help you stop using drugs or drinking alcohol.  Do not keep alcohol or drugs in your home.  Plan ahead. Think about what you'll say if other people ask you to drink or use drugs. Try not to spend time with people who drink or use drugs.  Use the time and money spent on drinking or drugs to do something that's important to you.  Preventing a relapse  Have a plan to deal with relapse. Learn to recognize changes in your thinking that lead you to drink or use drugs. Get help before you start to drink or use drugs again.  Try to stay away from situations, friends, or places that may lead you to drink or use drugs.  If you feel the need to drink alcohol or use drugs again, seek help right away. Call a trusted friend or family member. Some people get support from organizations such as Narcotics Anonymous or [employer] or from treatment facilities.  If you relapse, get help as soon as you can. Some people make a plan with another person that outlines what they want that person to do for them if they relapse. The plan usually includes how to handle the relapse and who to notify in case of relapse.  Don't give up. Remember that a relapse doesn't mean that you have failed. Use the experience to learn the triggers that lead you to drink or use drugs. Then quit again. Recovery is a lifelong process. Many people have several relapses before they are able to quit for good.  Follow-up care is a key part of your treatment and safety. Be sure to make and go to all appointments, and call your doctor if you  "are having problems. It's also a good idea to know your test results and keep a list of the medicines you take.  When should you call for help?   Call 911  anytime you think you may need emergency care. For example, call if you or someone else:    Has overdosed or has withdrawal signs. Be sure to tell the emergency workers that you are or someone else is using or trying to quit using drugs. Overdose or withdrawal signs may include:  Losing consciousness.  Seizure.  Seeing or hearing things that aren't there (hallucinations).     Is thinking or talking about suicide or harming others.   Where to get help 24 hours a day, 7 days a week   If you or someone you know talks about suicide, self-harm, a mental health crisis, a substance use crisis, or any other kind of emotional distress, get help right away. You can:    Call the Suicide and Crisis Lifeline at 988.     Call 8-787-101-TALK (1-967.277.7975).     Text HOME to 755481 to access the Crisis Text Line.   Consider saving these numbers in your phone.  Go to [employer] for more information or to chat online.  Call your doctor now or seek immediate medical care if:    You are having withdrawal symptoms. These may include nausea or vomiting, sweating, shakiness, and anxiety.   Watch closely for changes in your health, and be sure to contact your doctor if:    You have a relapse.     You need more help or support to stop.   Where can you learn more?  Go to https://www.[employer].net/patiented  Enter H573 in the search box to learn more about \"Substance Use Disorder: Care Instructions.\"  Current as of: March 21, 2023               Content Version: 13.8    5781-2408 [employer].   Care instructions adapted under license by your healthcare professional. If you have questions about a medical condition or this instruction, always ask your healthcare professional. [employer] disclaims any warranty or liability for your use of this " information.

## 2024-07-02 ENCOUNTER — VIRTUAL VISIT (OUTPATIENT)
Dept: FAMILY MEDICINE | Facility: CLINIC | Age: 44
End: 2024-07-02
Payer: COMMERCIAL

## 2024-07-02 ENCOUNTER — ORDERS ONLY (AUTO-RELEASED) (OUTPATIENT)
Dept: FAMILY MEDICINE | Facility: CLINIC | Age: 44
End: 2024-07-02

## 2024-07-02 DIAGNOSIS — F33.0 MILD EPISODE OF RECURRENT MAJOR DEPRESSIVE DISORDER (H): ICD-10-CM

## 2024-07-02 DIAGNOSIS — Z12.11 SCREEN FOR COLON CANCER: ICD-10-CM

## 2024-07-02 DIAGNOSIS — Z12.11 SCREEN FOR COLON CANCER: Primary | ICD-10-CM

## 2024-07-02 PROCEDURE — 99213 OFFICE O/P EST LOW 20 MIN: CPT | Mod: 95 | Performed by: NURSE PRACTITIONER

## 2024-07-02 NOTE — PROGRESS NOTES
"Izzy is a 44 year old who is being evaluated via a billable video visit.    How would you like to obtain your AVS? MyChart  If the video visit is dropped, the invitation should be resent by: Text to cell phone: [phone]  Will anyone else be joining your video visit? No      Assessment & Plan     (Z12.11) Screen for colon cancer  (primary encounter diagnosis)  Comment:   Plan: COLOGUARD([employer])            (F33.0) Mild episode of recurrent major depressive disorder (H24)  Comment:   Plan: see copy of paperwork today for employer          BMI  Estimated body mass index is 25.73 kg/m  as calculated from the following:    Height as of 2/21/24: 1.664 m (5' 5.5\").    Weight as of 2/21/24: 71.2 kg (157 lb).             Subjective   Izzy is a 44 year old, presenting for the following health issues:    Last summer she was told to work part time, she lost 59% of her commission pay and the supervisors were all fired due to some communication problems. She is currently at the same job but if she comes into the office she starts to cry and cannot function nor can she complete any of her job function. When she works from home out of the stress of the office she operates very well  See copy of form that needs completion.    Depression is well controlled if she is working from home  Forms      7/2/2024    11:23 AM   Additional Questions   Roomed by tierra dubose   Accompanied by self         7/2/2024   Forms   Any forms needing to be completed Yes        HPI                 Objective           Vitals:  No vitals were obtained today due to virtual visit.    Physical Exam   GENERAL: alert and no distress  EYES: Eyes grossly normal to inspection.  No discharge or erythema, or obvious scleral/conjunctival abnormalities.  RESP: No audible wheeze, cough, or visible cyanosis.    SKIN: Visible skin clear. No significant rash, abnormal pigmentation or lesions.  NEURO: Cranial nerves grossly intact.  Mentation and speech appropriate " for age.  PSYCH: Appropriate affect, tone, and pace of words          Video-Visit Details    Type of service:  Video Visit   Originating Location (pt. Location): Home    Distant Location (provider location):  On-site  Platform used for Video Visit: Rae  Signed Electronically by: Joana Winston NP

## 2024-07-21 ENCOUNTER — HEALTH MAINTENANCE LETTER (OUTPATIENT)
Age: 44
End: 2024-07-21

## 2024-07-24 LAB — NONINV COLON CA DNA+OCC BLD SCRN STL QL: NEGATIVE

## 2025-05-19 ENCOUNTER — MYC MEDICAL ADVICE (OUTPATIENT)
Dept: FAMILY MEDICINE | Facility: CLINIC | Age: 45
End: 2025-05-19
Payer: COMMERCIAL

## 2025-07-21 ENCOUNTER — PATIENT OUTREACH (OUTPATIENT)
Dept: CARE COORDINATION | Facility: CLINIC | Age: 45
End: 2025-07-21
Payer: COMMERCIAL